# Patient Record
Sex: FEMALE | Race: WHITE | NOT HISPANIC OR LATINO | Employment: UNEMPLOYED | URBAN - METROPOLITAN AREA
[De-identification: names, ages, dates, MRNs, and addresses within clinical notes are randomized per-mention and may not be internally consistent; named-entity substitution may affect disease eponyms.]

---

## 2017-06-13 LAB
A1C: 5.3
CHOLEST SERPL-MSCNC: 219 MG/DL (ref 0–200)
HDLC SERPL-MCNC: 59 MG/DL
LDLC SERPL CALC-MCNC: 142 MG/DL
NON HDL CHOL. (LDL+VLDL): 160
TRIGL SERPL-MCNC: 89 MG/DL
TSH SERPL DL<=0.05 MIU/L-ACNC: 2.04 UIU/ML

## 2017-10-06 ENCOUNTER — OFFICE VISIT (OUTPATIENT)
Dept: INTERNAL MEDICINE | Facility: CLINIC | Age: 57
End: 2017-10-06
Payer: COMMERCIAL

## 2017-10-06 ENCOUNTER — OFFICE VISIT (OUTPATIENT)
Dept: GASTROENTEROLOGY | Facility: CLINIC | Age: 57
End: 2017-10-06
Payer: COMMERCIAL

## 2017-10-06 VITALS
OXYGEN SATURATION: 97 % | HEART RATE: 75 BPM | TEMPERATURE: 97 F | SYSTOLIC BLOOD PRESSURE: 132 MMHG | HEIGHT: 66 IN | BODY MASS INDEX: 47.09 KG/M2 | WEIGHT: 293 LBS | DIASTOLIC BLOOD PRESSURE: 80 MMHG

## 2017-10-06 VITALS
HEIGHT: 66 IN | BODY MASS INDEX: 47.09 KG/M2 | DIASTOLIC BLOOD PRESSURE: 85 MMHG | HEART RATE: 70 BPM | WEIGHT: 293 LBS | SYSTOLIC BLOOD PRESSURE: 132 MMHG

## 2017-10-06 DIAGNOSIS — R13.10 DYSPHAGIA, UNSPECIFIED TYPE: Primary | ICD-10-CM

## 2017-10-06 DIAGNOSIS — K21.9 GASTROESOPHAGEAL REFLUX DISEASE, ESOPHAGITIS PRESENCE NOT SPECIFIED: ICD-10-CM

## 2017-10-06 DIAGNOSIS — R13.19 ESOPHAGEAL DYSPHAGIA: Primary | ICD-10-CM

## 2017-10-06 DIAGNOSIS — K92.0 HEMATEMESIS WITHOUT NAUSEA: ICD-10-CM

## 2017-10-06 DIAGNOSIS — M17.4 OTHER SECONDARY OSTEOARTHRITIS OF BOTH KNEES: ICD-10-CM

## 2017-10-06 PROBLEM — M19.91 PRIMARY OSTEOARTHRITIS: Status: ACTIVE | Noted: 2017-06-28

## 2017-10-06 PROBLEM — I10 HTN (HYPERTENSION): Status: ACTIVE | Noted: 2017-10-06

## 2017-10-06 PROBLEM — F32.A DEPRESSION: Status: ACTIVE | Noted: 2017-10-06

## 2017-10-06 PROBLEM — E78.00 HIGH CHOLESTEROL: Status: ACTIVE | Noted: 2017-10-06

## 2017-10-06 PROCEDURE — 99999 PR PBB SHADOW E&M-EST. PATIENT-LVL III: CPT | Mod: PBBFAC,,, | Performed by: INTERNAL MEDICINE

## 2017-10-06 PROCEDURE — 99999 PR PBB SHADOW E&M-NEW PATIENT-LVL III: CPT | Mod: PBBFAC,,, | Performed by: FAMILY MEDICINE

## 2017-10-06 PROCEDURE — 99203 OFFICE O/P NEW LOW 30 MIN: CPT | Mod: S$GLB,,, | Performed by: INTERNAL MEDICINE

## 2017-10-06 PROCEDURE — 99203 OFFICE O/P NEW LOW 30 MIN: CPT | Mod: S$GLB,,, | Performed by: FAMILY MEDICINE

## 2017-10-06 RX ORDER — CELECOXIB 200 MG/1
200 CAPSULE ORAL DAILY
COMMUNITY
Start: 2017-09-25 | End: 2018-02-06 | Stop reason: ALTCHOICE

## 2017-10-06 RX ORDER — TRIAMCINOLONE ACETONIDE 1 MG/G
CREAM TOPICAL
COMMUNITY
Start: 2017-09-25 | End: 2020-01-23

## 2017-10-06 RX ORDER — SUCRALFATE 1 G/1
1 TABLET ORAL
Status: ON HOLD | COMMUNITY
Start: 2017-10-04 | End: 2017-10-11 | Stop reason: HOSPADM

## 2017-10-06 RX ORDER — PANTOPRAZOLE SODIUM 40 MG/1
40 TABLET, DELAYED RELEASE ORAL DAILY
Qty: 30 TABLET | Refills: 11 | Status: SHIPPED | OUTPATIENT
Start: 2017-10-06 | End: 2018-11-30 | Stop reason: SDUPTHER

## 2017-10-06 RX ORDER — LOSARTAN POTASSIUM 50 MG/1
100 TABLET ORAL
COMMUNITY
Start: 2017-08-08 | End: 2018-11-02 | Stop reason: SDUPTHER

## 2017-10-06 RX ORDER — BUPROPION HYDROCHLORIDE 150 MG/1
300 TABLET ORAL DAILY
COMMUNITY
Start: 2017-09-25 | End: 2018-04-11 | Stop reason: SDUPTHER

## 2017-10-06 RX ORDER — MELOXICAM 7.5 MG/1
7.5 TABLET ORAL DAILY
COMMUNITY
Start: 2017-09-25 | End: 2018-02-21 | Stop reason: SDUPTHER

## 2017-10-06 RX ORDER — DICLOFENAC SODIUM 10 MG/G
2 GEL TOPICAL 4 TIMES DAILY
Qty: 1 TUBE | Refills: 3 | Status: SHIPPED | OUTPATIENT
Start: 2017-10-06 | End: 2019-09-04

## 2017-10-06 RX ORDER — HYDROCHLOROTHIAZIDE 12.5 MG/1
12.5 CAPSULE ORAL DAILY
COMMUNITY
Start: 2017-09-25

## 2017-10-06 NOTE — LETTER
October 6, 2017      Henry Jaramillo MD  [address] [place] [id number]           [facility]  [address] [place] [id number]  Phone: [phone]  Fax: [phone]          Patient: Malina Herbert   MR Number: [id number]   YOB: 1960   Date of Visit: 10/6/2017       Dear Dr. Henry Jaramillo:    Thank you for referring Malina Herbert to me for evaluation. Attached you will find relevant portions of my assessment and plan of care.    If you have questions, please do not hesitate to call me. I look forward to following Malina Herbert along with you.    Sincerely,    Galen Gonzalez III, MD    Enclosure  CC:  No Recipients    If you would like to receive this communication electronically, please contact [email or web address] or [phone] to request more information on [employer] access.    For providers and/or their staff who would like to refer a patient to [facility], please contact us through our one-stop-shop provider referral line, [facility], at [phone].    If you feel you have received this communication in error or would no longer like to receive these types of communications, please e-mail [email or web address]

## 2017-10-06 NOTE — PROGRESS NOTES
Subjective:       Patient ID: Malina Herbert is a 57 y.o. female.    Chief Complaint: Dysphagia and Cough (blood)    The patient complains of dysphagia to rice and meats predominantly off and on for ~ the last year. This seems to be getting worse over the past 2 months, meaning increasing in frequency. She has a history of GERD with mild occurrence 4-5 years ago which was treated with Nexium. She has not required daily treatment since then but she still has to take PPI every now and then depending on what she eats or drinks. She has increased her weight over the past year some 20-30 pounds.     There is no aversion to the sight or smell of food. There is no BRBPR or melena. There is a degree of early satiety. There is no change in bowel habits as long as she drinks her coffee. She at times has to vomit up food lodged in her esophagus and has recently seen some blood. She had an esophagram Wednesday showing a small sliding H/H but was otherwise normal. She has been on Mobic or Celebrex for arthritis.      Review of Systems   Constitutional: Negative for activity change, appetite change, chills, diaphoresis, fatigue, fever and unexpected weight change.   HENT: Positive for sore throat. Negative for congestion, ear discharge, ear pain, hearing loss, nosebleeds, postnasal drip and tinnitus.    Eyes: Negative for photophobia and visual disturbance.   Respiratory: Negative for apnea, cough, choking, chest tightness, shortness of breath and wheezing.    Cardiovascular: Positive for chest pain. Negative for palpitations and leg swelling.   Gastrointestinal: Negative for abdominal distention, abdominal pain, anal bleeding, blood in stool, constipation, diarrhea, nausea, rectal pain and vomiting.   Genitourinary: Negative for difficulty urinating, dyspareunia, dysuria, flank pain, frequency, hematuria, menstrual problem, pelvic pain, urgency, vaginal bleeding and vaginal discharge.   Musculoskeletal: Positive for back pain and  gait problem. Negative for arthralgias, joint swelling, myalgias and neck stiffness.   Skin: Negative for pallor and rash.   Neurological: Negative for dizziness, tremors, seizures, syncope, speech difficulty, weakness, numbness and headaches.   Hematological: Negative for adenopathy.   Psychiatric/Behavioral: Negative for agitation, confusion, hallucinations, sleep disturbance and suicidal ideas.       Objective:      Physical Exam   Constitutional: She is oriented to person, place, and time. She appears well-developed and well-nourished.   Morbid Obesity   HENT:   Head: Normocephalic and atraumatic.   Bilateral turbinate congestion   Eyes: Conjunctivae and EOM are normal. Pupils are equal, round, and reactive to light. Right eye exhibits no discharge. Left eye exhibits no discharge. No scleral icterus.   Neck: Normal range of motion. Neck supple. No JVD present. No thyromegaly present.   Cardiovascular: Normal rate, regular rhythm, normal heart sounds and intact distal pulses.  Exam reveals no gallop and no friction rub.    No murmur heard.  Pulmonary/Chest: Effort normal and breath sounds normal. No respiratory distress. She has no wheezes. She has no rales. She exhibits no tenderness.   Abdominal: Soft. Bowel sounds are normal. She exhibits no distension and no mass. There is no tenderness. There is no rebound and no guarding.   Musculoskeletal: Normal range of motion. She exhibits no edema.   Lymphadenopathy:     She has no cervical adenopathy.   Neurological: She is alert and oriented to person, place, and time. She has normal reflexes. She exhibits normal muscle tone. Coordination normal.   Skin: Skin is warm and dry. No rash noted. No erythema. No pallor.   Psychiatric: She has a normal mood and affect. Her behavior is normal. Judgment and thought content normal.   Vitals reviewed.      Assessment:     Dysphagia    Hematemesis    Morbid Obesity.    DJD    Hx of GERD  No diagnosis found.    Plan:     EGD

## 2017-10-06 NOTE — PROGRESS NOTES
"Subjective:       Patient ID: Malina Herbert is a 57 y.o. female.    Chief Complaint: Hospital Follow Up (unable to swallow having nausea and vomiting, bloody emesis.)    HPI     56 yo F  PMH of Osteoarthritis - Knee - bilateral  Takes meloxicam 7.5 and Celebrex and Joint MD ( an ordered tablet - Does not appear to be NSAID on Internet search - From [facility] )    2 days ago -  ER Visit  OLOL  Diagnosis left with was " Hiatal Hernia and Hematemesis without Nausea"    Went to ER because was throwing up a lot of blood. Reports she was having chest pain.  Reports she was vomiting for past 6 months - with trouble swallowing - feels eats a few bites and feels like it get stuck. No matter how small.  Unless she throws it up she can't swallow.  Feels certain foods get stuck - rice, meat.  Liquids can go down - but states once gets hard to swallow meat it gets hard to swallow liquids.    Wednesday morning was eating - felt bad - threw up - and on 2nd throw up she felt it was  Big clump of blood. First vomit was food, then 2nd vomit felt a lot of blood come up.      Esophagram  Performed  " Esophagus shows normal distensibility, course, caliber, contour, no focal lesion, Mass, Normal transit and epmtying into stomach, Sphincters open normal, Small hiatal hernia, No reflux"    Takes Mobic daily for long time.  Takes celebrex daily for several months.      Chest X'ray  14 mm fairly dense nodular opacity seen in R middle lobe"    Review of Systems   Constitutional: Negative for chills and fever.   HENT: Positive for sore throat and trouble swallowing. Negative for congestion and voice change.    Respiratory: Negative for shortness of breath.    Cardiovascular: Negative for chest pain.   Gastrointestinal: Positive for vomiting. Negative for abdominal pain, blood in stool, constipation, diarrhea and nausea.   Endocrine: Negative for polydipsia and polyuria.        Takes a fluid pill - makes her urinate more   Genitourinary: Negative " "for difficulty urinating and dysuria.   Musculoskeletal: Positive for arthralgias, back pain and gait problem.       Objective:      Physical Exam   Constitutional: She is oriented to person, place, and time. She appears well-developed and well-nourished. She does not have a sickly appearance. No distress.   HENT:   Head: Normocephalic and atraumatic.   Right Ear: External ear normal.   Left Ear: External ear normal.   Eyes: Conjunctivae, EOM and lids are normal.   Neck: Trachea normal, normal range of motion and full passive range of motion without pain.   Cardiovascular: Normal rate, regular rhythm, normal heart sounds and intact distal pulses.    Pulmonary/Chest: Effort normal and breath sounds normal. No stridor. No respiratory distress.   Abdominal: Soft. Normal appearance and bowel sounds are normal. She exhibits no distension. There is no tenderness. There is no guarding. No hernia.   Musculoskeletal: Normal range of motion.   Lymphadenopathy:     She has no cervical adenopathy.   Neurological: She is alert and oriented to person, place, and time. She is not disoriented.   Skin: Skin is warm, dry and intact. No rash noted. She is not diaphoretic.   Psychiatric: She has a normal mood and affect. Her speech is normal and behavior is normal. Thought content normal.       Assessment:       1. Dysphagia, unspecified type    2. Hematemesis without nausea        Plan:   Dysphagia, unspecified type  GI evaluation:    Hematemesis without nausea    Patient takes 2 NSAIDS -daily  Mobic and Celebrex  Concerned about NSAID induced gastritis. Must also consider esophageal tears - she does vomit frequently due to swallow problems.  Advising patient to Stop NSAIDS.  No further blood in vomit for 2 days  Prescribing PPI  Needs GI evaluation for Dysphagia and Hematemesis.  Acetaminophen and Voltaren Gel.    Reviewed patients "Mychart" from her PCP  Renal function good.  A1c decent  High cholesterol noted.              Patient " has a PCP. She has a follow up visit next week with him.    Advising her to follow up with him about lung nodule.

## 2017-10-11 ENCOUNTER — ANESTHESIA EVENT (OUTPATIENT)
Dept: ENDOSCOPY | Facility: HOSPITAL | Age: 57
End: 2017-10-11
Payer: COMMERCIAL

## 2017-10-11 ENCOUNTER — HOSPITAL ENCOUNTER (OUTPATIENT)
Facility: HOSPITAL | Age: 57
Discharge: HOME | End: 2017-10-11
Attending: INTERNAL MEDICINE | Admitting: INTERNAL MEDICINE
Payer: COMMERCIAL

## 2017-10-11 ENCOUNTER — SURGERY (OUTPATIENT)
Age: 57
End: 2017-10-11

## 2017-10-11 ENCOUNTER — ANESTHESIA (OUTPATIENT)
Dept: ENDOSCOPY | Facility: HOSPITAL | Age: 57
End: 2017-10-11
Payer: COMMERCIAL

## 2017-10-11 VITALS
WEIGHT: 293 LBS | RESPIRATION RATE: 18 BRPM | BODY MASS INDEX: 47.09 KG/M2 | HEIGHT: 66 IN | HEART RATE: 69 BPM | SYSTOLIC BLOOD PRESSURE: 131 MMHG | DIASTOLIC BLOOD PRESSURE: 89 MMHG | TEMPERATURE: 98 F | OXYGEN SATURATION: 98 %

## 2017-10-11 DIAGNOSIS — K25.9 MULTIPLE GASTRIC ULCERS: ICD-10-CM

## 2017-10-11 DIAGNOSIS — K20.80 ESOPHAGITIS, LOS ANGELES GRADE D: Primary | ICD-10-CM

## 2017-10-11 DIAGNOSIS — R13.10 DYSPHAGIA: ICD-10-CM

## 2017-10-11 PROCEDURE — 37000009 HC ANESTHESIA EA ADD 15 MINS: Performed by: INTERNAL MEDICINE

## 2017-10-11 PROCEDURE — 88305 TISSUE EXAM BY PATHOLOGIST: CPT | Performed by: PATHOLOGY

## 2017-10-11 PROCEDURE — 27201012 HC FORCEPS, HOT/COLD, DISP: Performed by: INTERNAL MEDICINE

## 2017-10-11 PROCEDURE — 43239 EGD BIOPSY SINGLE/MULTIPLE: CPT | Performed by: INTERNAL MEDICINE

## 2017-10-11 PROCEDURE — 37000008 HC ANESTHESIA 1ST 15 MINUTES: Performed by: INTERNAL MEDICINE

## 2017-10-11 PROCEDURE — 63600175 PHARM REV CODE 636 W HCPCS: Performed by: NURSE ANESTHETIST, CERTIFIED REGISTERED

## 2017-10-11 PROCEDURE — 25000003 PHARM REV CODE 250: Performed by: NURSE ANESTHETIST, CERTIFIED REGISTERED

## 2017-10-11 PROCEDURE — 25000003 PHARM REV CODE 250: Performed by: INTERNAL MEDICINE

## 2017-10-11 PROCEDURE — 88305 TISSUE EXAM BY PATHOLOGIST: CPT | Mod: 26,,, | Performed by: PATHOLOGY

## 2017-10-11 RX ORDER — LIDOCAINE HYDROCHLORIDE 20 MG/ML
INJECTION, SOLUTION EPIDURAL; INFILTRATION; INTRACAUDAL; PERINEURAL
Status: DISCONTINUED | OUTPATIENT
Start: 2017-10-11 | End: 2017-10-11

## 2017-10-11 RX ORDER — PROPOFOL 10 MG/ML
VIAL (ML) INTRAVENOUS
Status: DISCONTINUED | OUTPATIENT
Start: 2017-10-11 | End: 2017-10-11

## 2017-10-11 RX ORDER — SODIUM CHLORIDE, SODIUM LACTATE, POTASSIUM CHLORIDE, CALCIUM CHLORIDE 600; 310; 30; 20 MG/100ML; MG/100ML; MG/100ML; MG/100ML
INJECTION, SOLUTION INTRAVENOUS CONTINUOUS PRN
Status: DISCONTINUED | OUTPATIENT
Start: 2017-10-11 | End: 2017-10-11

## 2017-10-11 RX ORDER — SUCRALFATE 1 G/10ML
1 SUSPENSION ORAL EVERY 6 HOURS
Status: DISCONTINUED | OUTPATIENT
Start: 2017-10-11 | End: 2017-10-11 | Stop reason: HOSPADM

## 2017-10-11 RX ORDER — SODIUM CHLORIDE, SODIUM LACTATE, POTASSIUM CHLORIDE, CALCIUM CHLORIDE 600; 310; 30; 20 MG/100ML; MG/100ML; MG/100ML; MG/100ML
INJECTION, SOLUTION INTRAVENOUS CONTINUOUS
Status: DISCONTINUED | OUTPATIENT
Start: 2017-10-11 | End: 2017-10-11 | Stop reason: HOSPADM

## 2017-10-11 RX ADMIN — LIDOCAINE HYDROCHLORIDE 40 MG: 20 INJECTION, SOLUTION EPIDURAL; INFILTRATION; INTRACAUDAL; PERINEURAL at 07:10

## 2017-10-11 RX ADMIN — PROPOFOL 40 MG: 10 INJECTION, EMULSION INTRAVENOUS at 07:10

## 2017-10-11 RX ADMIN — SODIUM CHLORIDE, SODIUM LACTATE, POTASSIUM CHLORIDE, AND CALCIUM CHLORIDE: 600; 310; 30; 20 INJECTION, SOLUTION INTRAVENOUS at 07:10

## 2017-10-11 RX ADMIN — PROPOFOL 60 MG: 10 INJECTION, EMULSION INTRAVENOUS at 07:10

## 2017-10-11 RX ADMIN — SODIUM CHLORIDE, SODIUM LACTATE, POTASSIUM CHLORIDE, AND CALCIUM CHLORIDE: 600; 310; 30; 20 INJECTION, SOLUTION INTRAVENOUS at 06:10

## 2017-10-11 NOTE — DISCHARGE SUMMARY
[facility] -   Brief Operative Note     SUMMARY     Surgery Date: 10/11/2017     Surgeon(s) and Role:     * Galen Gonzalez III, MD - Primary    Assisting Surgeon: None    Pre-op Diagnosis:  Esophageal dysphagia [R13.10]  Hematemesis without nausea [K92.0]    Post-op Diagnosis:  Post-Op Diagnosis Codes:     * Esophageal dysphagia [R13.10]     * Hematemesis without nausea [K92.0]      - Esophagitis      - Gastric Ulcers  Procedure(s) (LRB):  ESOPHAGOGASTRODUODENOSCOPY (EGD) (N/A)    Anesthesia: Monitor Anesthesia Care    Description of the findings of the procedure: Procedures completed. See Procedure note for full details.    Findings/Key Components: Procedures completed. See Procedure note for full details.    Prosthetics/Devices: None    Estimated Blood Loss: * No values recorded between 10/11/2017 12:00 AM and 10/11/2017  7:56 AM *         Specimens:   Specimen (12h ago through future)    Start     Ordered    10/11/17 0729  Specimen to Pathology - Surgery  Once     Comments:  1.  Antrum ulcers Biopsy ,superficial2.  Esophagus biopsy,Esophaguitis      10/11/17 0740          Discharge Note    SUMMARY     Admit Date: 10/11/2017    Discharge Date and Time: 10/11/2017    Hospital Course (synopsis of major diagnoses, care, treatment, and services provided during the course of the hospital stay):  Procedures completed. See Procedure note for full details. Discharge patient when discharge criteria met.    Final Diagnosis: Post-Op Diagnosis Codes:     * Esophageal dysphagia [R13.10]     * Hematemesis without nausea [K92.0]      - Esophagitis      - Gastric Ulcers  Disposition: Discharge patient when discharge criteria met.    Follow Up/Patient Instructions:       Medications:  Reconciled Home Medications: Current Discharge Medication List      CONTINUE these medications which have NOT CHANGED    Details   buPROPion (WELLBUTRIN XL) 150 MG TB24 tablet Take 150 mg by mouth once daily.      celecoxib (CELEBREX)  200 MG capsule Take 200 mg by mouth once daily.      diclofenac sodium 1 % Gel Apply 2 g topically 4 (four) times daily.  Qty: 1 Tube, Refills: 3      hydrochlorothiazide (MICROZIDE) 12.5 mg capsule Take 12.5 mg by mouth once daily.      losartan (COZAAR) 50 MG tablet Take 50 mg by mouth.      meloxicam (MOBIC) 7.5 MG tablet Take 7.5 mg by mouth once daily.      pantoprazole (PROTONIX) 40 MG tablet Take 1 tablet (40 mg total) by mouth once daily.  Qty: 30 tablet, Refills: 11    Associated Diagnoses: Hematemesis without nausea      sucralfate (CARAFATE) 1 gram tablet Take 1 g by mouth.      triamcinolone acetonide 0.1% (KENALOG) 0.1 % cream               Discharge Procedure Orders  Diet general     Activity as tolerated

## 2017-10-11 NOTE — ANESTHESIA POSTPROCEDURE EVALUATION
"Anesthesia Post Evaluation    Patient: Terri Herbert    Procedure(s) Performed: Procedure(s) (LRB):  ESOPHAGOGASTRODUODENOSCOPY (EGD) (N/A)    Final Anesthesia Type: MAC  Patient location during evaluation: GI PACU  Patient participation: Yes- Able to Participate  Level of consciousness: awake and alert and oriented  Post-procedure vital signs: reviewed and stable  Pain management: adequate  Airway patency: patent  PONV status at discharge: No PONV  Anesthetic complications: no      Cardiovascular status: hemodynamically stable  Respiratory status: unassisted, spontaneous ventilation and room air  Hydration status: euvolemic  Follow-up not needed.        Visit Vitals  /74 (BP Location: Left arm, Patient Position: Lying)   Pulse 75   Temp 36.6 °C (97.8 °F) (Oral)   Resp 17   Ht 5' 5.5" (1.664 m)   Wt (!) 166 kg (366 lb)   SpO2 (!) 94%   Breastfeeding? No   BMI 59.98 kg/m²       Pain/Shagufta Score: No Data Recorded      "

## 2017-10-11 NOTE — DISCHARGE INSTRUCTIONS
Esophagitis     With esophagitis, the lining of the esophagus is inflamed.   Do you often have burning pain in your chest? You may have esophagitis. This is when the lining of the esophagus becomes red and swollen (inflamed). The esophagus is the tube that connects your throat to your stomach. This sheet tells you more about esophagitis. It also explains your treatment options.  Main types of esophagitis  Reflux esophagitis. This is the more common type. It is caused by GERD (gastroesophageal reflux disease). Stomach contents with stomach acid flow back up into the esophagus. This happens over and over. It leads to inflammation. Risk factors can include:  · Being overweight  · Asthma  · Smoking  · Pregnancy  · Frequent vomiting  · Certain medicines (such as aspirin and other anti-inflammatories)  · Hiatal hernia  Infectious esophagitis. This is caused by an infection. You are more at risk for this if you have a weakened immune system and poor nutrition. Antibiotic use can also be a factor. The infection is often due to the following:  · A type of fungus (typically candida)  · A virus, such as herpes simplex virus 1 (HSV-1) or cytomegalovirus (CMV)  Eosinophilic esophagitis. Foods or other things around you can give you an allergic reaction. This triggers an immune response and leads to esophagitis.  Pill-induced esophagitis. Certain types of medicines can cause inflammation and ulcers in the esophagus. These include doxycycline, aspirin, NSAIDs, alendronate, potassium, quinidine, iron.  Symptoms of esophagitis  The following symptoms can occur with esophagitis:  · Pain when swallowing, or trouble swallowing  · Pain behind your breastbone (heartburn)  · Acid regurgitation  · Chronic sore throat  · Gum Inflammation  · Cavities  · Bad breath  · Nausea  · Pain in your upper belly (abdomen)  · Bleeding (indicated by bright red vomit or black, tarry stool)  These symptoms occur more often with reflux  esophagitis:  · Coughing, wheezing, or asthma  · Hoarseness  Diagnosis of esophagitis  Your healthcare provider will ask about your health history and symptoms. Youll also be examined. Sometimes certain tests are needed. These may include:  · Upper endoscopy. A thin, flexible tube with a tiny light and camera is used. It is inserted through the mouth down into the esophagus. This lets the provider look for damage. A small sample of tissue (biopsy) may also be removed. The sample is sent to a lab for testing.  · Upper GI X-ray with barium. An X-ray is done after you drink a substance called barium. Barium may make problems in the esophagus easier to see on an x-ray.  · Esophageal pH. A soft, thin tube is passed into the esophagus through the nose or mouth for 24 hours. It measures the acid level in the esophagus.  · Esophageal manometry. A soft, thin tube is passed into the esophagus through the nose or mouth. It measures muscle contractions in the esophagus.  Treatment of esophagitis  Medicines. Different medicines can help treat esophagitis. The medicine used will depend on the type of esophagitis you have. Talk with your healthcare provider.  Lifestyle changes. Making the following changes can help reduce irritation and ease your symptoms:  · Avoid spicy foods (pepper, chili powder, morris). Also avoid hard foods (nuts, crackers, raw vegetables) and acidic foods and drinks (tomatoes, citrus fruits and juices). Other problem foods include chocolate, peppermint, nutmeg, and foods high in fat.  · Until you can swallow without pain, follow a combined liquid and soft diet. Try foods such as cooked cereals, mashed potatoes, and soups.  · Take small bites and chew your food thoroughly.  · Avoid large meals and heavy evening meals. Don't lie down within 2 to 3 hours of eating.  · Get to or stay at a healthy weight.  · Avoid alcohol, caffeine, and smoking or tobacco products.  · Brush and floss your teeth  · Raise your  upper body by 4 to 6 inches when lying in bed. This can be done using a foam wedge. Or put blocks under the legs at the head of your bed.  Surgery. This may be needed for severe reflux esophagitis. Other noninvasive procedures to treat GERD and esophagitis are being studied. Your provider can tell you more.  Why treatment Is important  Without treatment, esophagitis can get worse. This is especially true with severe reflux esophagitis. For instance, continued symptoms can cause scarring of the esophagus. Over time, this can cause a narrowing the esophagus (stricture). This can make it hard to pass food down to the stomach. As symptoms go on they can also cause changes in the lining of the esophagus. These changes can put you at a slightly higher risk of cancer of the esophagus.   Date Last Reviewed: 7/1/2016  © 2887-5586 [employer]. [address] [place] [address], [place] [address]. All rights reserved. This information is not intended as a substitute for professional medical care. Always follow your healthcare professional's instructions.

## 2017-10-11 NOTE — ANESTHESIA RELEASE NOTE
"Anesthesia Release from PACU Note    Patient: Terri Herbert    Procedure(s) Performed: Procedure(s) (LRB):  ESOPHAGOGASTRODUODENOSCOPY (EGD) (N/A)    Anesthesia type: MAC    Post pain: Adequate analgesia    Post assessment: no apparent anesthetic complications and tolerated procedure well    Last Vitals:   Visit Vitals  /74 (BP Location: Left arm, Patient Position: Lying)   Pulse 75   Temp 36.6 °C (97.8 °F) (Oral)   Resp 17   Ht 5' 5.5" (1.664 m)   Wt (!) 166 kg (366 lb)   SpO2 (!) 94%   Breastfeeding? No   BMI 59.98 kg/m²       Post vital signs: stable    Level of consciousness: awake, alert  and oriented    Nausea/Vomiting: no nausea/no vomiting    Complications: none    Airway Patency: patent    Respiratory: unassisted, spontaneous ventilation, room air    Cardiovascular: stable and blood pressure at baseline    Hydration: euvolemic  "

## 2017-10-11 NOTE — TRANSFER OF CARE
"Anesthesia Transfer of Care Note    Patient: Terri Herbert    Procedure(s) Performed: Procedure(s) (LRB):  ESOPHAGOGASTRODUODENOSCOPY (EGD) (N/A)    Patient location: GI    Anesthesia Type: MAC    Transport from OR: Transported from OR on room air with adequate spontaneous ventilation    Post pain: adequate analgesia    Post assessment: no apparent anesthetic complications and tolerated procedure well    Post vital signs: stable    Level of consciousness: awake, alert and oriented    Nausea/Vomiting: no nausea/vomiting    Complications: none    Transfer of care protocol was followed      Last vitals:   Visit Vitals  /74 (BP Location: Left arm, Patient Position: Lying)   Pulse 75   Temp 36.6 °C (97.8 °F) (Oral)   Resp 17   Ht 5' 5.5" (1.664 m)   Wt (!) 166 kg (366 lb)   SpO2 (!) 94%   Breastfeeding? No   BMI 59.98 kg/m²     "

## 2017-10-11 NOTE — ANESTHESIA PREPROCEDURE EVALUATION
10/11/2017  Terri Herbert is a 57 y.o., female.    Anesthesia Evaluation    I have reviewed the Patient Summary Reports.    I have reviewed the Nursing Notes.   I have reviewed the Medications.     Review of Systems  Anesthesia Hx:  No problems with previous Anesthesia    Social:  Non-Smoker, No Alcohol Use    Hematology/Oncology:  Hematology Normal   Oncology Normal     EENT/Dental:EENT/Dental Normal   Cardiovascular:   Exercise tolerance: good Hypertension, well controlled    Pulmonary:  Pulmonary Normal    Renal/:  Renal/ Normal     Hepatic/GI:   GERD, well controlled    Musculoskeletal:   Arthritis     Neurological:  Neurology Normal    Endocrine:  Endocrine Normal    Dermatological:  Skin Normal    Psych:   anxiety depression          Physical Exam  General:  Well nourished, Obesity    Airway/Jaw/Neck:  Airway Findings: Mouth Opening: Normal Tongue: Normal  General Airway Assessment: Adult  Mallampati: II  TM Distance: 4 - 6 cm  Jaw/Neck Findings:  Neck ROM: Normal ROM      Dental:  Dental Findings: In tact   Chest/Lungs:  Chest/Lungs Findings: Clear to auscultation, Normal Respiratory Rate     Heart/Vascular:  Heart Findings: Rate: Normal  Rhythm: Regular Rhythm  Sounds: Normal     Abdomen:  Abdomen Findings:  Normal       Mental Status:  Mental Status Findings:  Cooperative, Alert and Oriented         Anesthesia Plan  Type of Anesthesia, risks & benefits discussed:  Anesthesia Type:  MAC  Patient's Preference:   Intra-op Monitoring Plan: standard ASA monitors  Intra-op Monitoring Plan Comments:   Post Op Pain Control Plan:   Post Op Pain Control Plan Comments:   Induction:   IV  Beta Blocker:  Patient is not currently on a Beta-Blocker (No further documentation required).       Informed Consent: Patient understands risks and agrees with Anesthesia plan.  Questions answered. Anesthesia consent  signed with patient.  ASA Score: 2     Day of Surgery Review of History & Physical: I have interviewed and examined the patient. I have reviewed the patient's H&P dated: 10/11/2017. There are no significant changes.          Ready For Surgery From Anesthesia Perspective.

## 2017-10-12 ENCOUNTER — TELEPHONE (OUTPATIENT)
Dept: GASTROENTEROLOGY | Facility: CLINIC | Age: 57
End: 2017-10-12

## 2017-10-12 DIAGNOSIS — K20.90 ESOPHAGITIS DETERMINED BY ENDOSCOPY: Primary | ICD-10-CM

## 2017-10-12 RX ORDER — SUCRALFATE 1 G/10ML
1 SUSPENSION ORAL
Qty: 414 ML | Refills: 0 | Status: SHIPPED | OUTPATIENT
Start: 2017-10-12 | End: 2017-10-23

## 2017-10-12 NOTE — PROGRESS NOTES
Spoke with Dr. Gonzalez.  Pt with esophagitis on endoscopy.  WIll send Carate to take with meals and bedtime.

## 2017-10-12 NOTE — TELEPHONE ENCOUNTER
----- Message from Ericka Lucero sent at 10/12/2017  2:50 PM CDT -----  Contact: PT   PT states she is still waiting on medicine from surgery. .[phone] (home)

## 2017-10-23 ENCOUNTER — TELEPHONE (OUTPATIENT)
Dept: GASTROENTEROLOGY | Facility: CLINIC | Age: 57
End: 2017-10-23

## 2017-10-23 NOTE — TELEPHONE ENCOUNTER
----- Message from Jessica Calhoun sent at 10/23/2017 10:11 AM CDT -----  Contact: Self  Pt is calling to speak with Staff regarding her test results.    She can be reached at [phone].    Thank you.

## 2017-10-24 ENCOUNTER — TELEPHONE (OUTPATIENT)
Dept: GASTROENTEROLOGY | Facility: CLINIC | Age: 57
End: 2017-10-24

## 2017-10-24 NOTE — TELEPHONE ENCOUNTER
----- Message from Mirlande Roman sent at 10/24/2017  8:23 AM CDT -----  Contact: pt  States she had surgery two weeks ago and she's calling regarding her test results. States she left a message yesterday and no one called her back. Please call pt at [phone]. Thank you

## 2017-10-25 ENCOUNTER — TELEPHONE (OUTPATIENT)
Dept: GASTROENTEROLOGY | Facility: CLINIC | Age: 57
End: 2017-10-25

## 2017-11-16 ENCOUNTER — OFFICE VISIT (OUTPATIENT)
Dept: GASTROENTEROLOGY | Facility: CLINIC | Age: 57
End: 2017-11-16
Payer: COMMERCIAL

## 2017-11-16 VITALS
BODY MASS INDEX: 47.09 KG/M2 | HEART RATE: 70 BPM | WEIGHT: 293 LBS | HEIGHT: 66 IN | DIASTOLIC BLOOD PRESSURE: 80 MMHG | SYSTOLIC BLOOD PRESSURE: 134 MMHG

## 2017-11-16 DIAGNOSIS — K22.2 ESOPHAGEAL STRICTURE: ICD-10-CM

## 2017-11-16 DIAGNOSIS — K25.4 CHRONIC GASTRIC ULCER WITH HEMORRHAGE: Primary | ICD-10-CM

## 2017-11-16 DIAGNOSIS — K21.00 GASTROESOPHAGEAL REFLUX DISEASE WITH ESOPHAGITIS: ICD-10-CM

## 2017-11-16 DIAGNOSIS — R13.19 ESOPHAGEAL DYSPHAGIA: ICD-10-CM

## 2017-11-16 PROCEDURE — 99213 OFFICE O/P EST LOW 20 MIN: CPT | Mod: S$GLB,,, | Performed by: INTERNAL MEDICINE

## 2017-11-16 PROCEDURE — 99999 PR PBB SHADOW E&M-EST. PATIENT-LVL III: CPT | Mod: PBBFAC,,, | Performed by: INTERNAL MEDICINE

## 2017-11-16 RX ORDER — GABAPENTIN 300 MG/1
300 CAPSULE ORAL 3 TIMES DAILY
COMMUNITY

## 2017-11-16 RX ORDER — SUCRALFATE 1 G/1
TABLET ORAL
Refills: 0 | COMMUNITY
Start: 2017-10-13 | End: 2018-02-06 | Stop reason: ALTCHOICE

## 2017-11-16 RX ORDER — METHOCARBAMOL 750 MG/1
TABLET, FILM COATED ORAL
Refills: 3 | COMMUNITY
Start: 2017-10-21 | End: 2018-05-24

## 2017-11-16 RX ORDER — LANSOPRAZOLE 30 MG/1
30 CAPSULE, DELAYED RELEASE ORAL
COMMUNITY
Start: 2017-11-07 | End: 2018-05-24

## 2017-11-16 RX ORDER — TRAMADOL HYDROCHLORIDE 50 MG/1
50 TABLET ORAL EVERY 8 HOURS PRN
Refills: 1 | COMMUNITY
Start: 2017-11-02 | End: 2018-05-10 | Stop reason: SDUPTHER

## 2017-11-16 NOTE — PROGRESS NOTES
Subjective:       Patient ID: Terri Herbert is a 57 y.o. female.    Chief Complaint: Hospital Follow Up    The patient who is known to our service from previous encounters, was initially seen by me in consultation on October 6.  At that time she was complaining of dysphagia and suggestion of hematemesis.  She subsequently underwent EGD on October 11 with findings of a medium size hiatal hernia with secondary grade D reflux esophagitis.  She was also found to have multiple nonbleeding antral gastric ulcers.  Biopsies of the ulcers were negative for dysplasia or H. pylori.  Biopsies of the esophagitis were consistent with the endoscopic evaluation.  No balloon dilation was attempted because of the presence of active inflammation.  The patient will need a repeat EGD for surveillance of the gastric ulcers and balloon dilation of the soft or stricture will be considered at that time.  We would have considered a 12 weeks for repeat, however, the patient's insurance coverage was said to be terminated at the end of the year so the repeat will be done in 8 weeks instead.    When asked about her response to treatment, she states she has only had 3 minor episodes of food hanging up since her previous evaluation.  She has had no further episodes of hematemesis.  I have voiced to her concerns regarding the use of Duran- 2 inhibitor therapy for her arthritis which appears to be severe.  She will have a discussion with the physicians taking care of her arthritis condition for consideration of an alternative treatment plan.  The patient may no longer be able to afford PPI coverage after the end of this year so we have discussed using OTC Protonix or Prevacid after her endoscopy findings have been assessed.          Review of Systems   Constitutional: Negative for activity change, appetite change, chills, diaphoresis, fatigue, fever and unexpected weight change.   HENT: Negative for congestion, ear discharge, ear pain, hearing  loss, nosebleeds, postnasal drip and tinnitus.    Eyes: Negative for photophobia and visual disturbance.   Respiratory: Negative for apnea, cough, choking, chest tightness, shortness of breath and wheezing.    Cardiovascular: Negative for chest pain, palpitations and leg swelling.   Gastrointestinal: Negative for abdominal distention, abdominal pain, anal bleeding, blood in stool, constipation, diarrhea, nausea, rectal pain and vomiting.   Genitourinary: Negative for difficulty urinating, dyspareunia, dysuria, flank pain, frequency, hematuria, menstrual problem, pelvic pain, urgency, vaginal bleeding and vaginal discharge.   Musculoskeletal: Positive for back pain, gait problem and joint swelling. Negative for arthralgias, myalgias and neck stiffness.        Joint stiffness   Skin: Positive for rash. Negative for pallor.        Discoloration of the lower legs associated with edema   Neurological: Negative for dizziness, tremors, seizures, syncope, speech difficulty, weakness, numbness and headaches.   Hematological: Negative for adenopathy.   Psychiatric/Behavioral: Negative for agitation, confusion, hallucinations, sleep disturbance and suicidal ideas.       Objective:      Physical Exam    Assessment:       1. Chronic gastric ulcer with hemorrhage    2. Gastroesophageal reflux disease with esophagitis    3. Esophageal stricture    4. Esophageal dysphagia        Plan:     Repeat EGD

## 2017-12-06 ENCOUNTER — SURGERY (OUTPATIENT)
Age: 57
End: 2017-12-06

## 2017-12-06 ENCOUNTER — ANESTHESIA (OUTPATIENT)
Dept: ENDOSCOPY | Facility: HOSPITAL | Age: 57
End: 2017-12-06
Payer: COMMERCIAL

## 2017-12-06 ENCOUNTER — ANESTHESIA EVENT (OUTPATIENT)
Dept: ENDOSCOPY | Facility: HOSPITAL | Age: 57
End: 2017-12-06
Payer: COMMERCIAL

## 2017-12-06 ENCOUNTER — HOSPITAL ENCOUNTER (OUTPATIENT)
Facility: HOSPITAL | Age: 57
Discharge: HOME | End: 2017-12-06
Attending: INTERNAL MEDICINE | Admitting: INTERNAL MEDICINE
Payer: COMMERCIAL

## 2017-12-06 VITALS — RESPIRATION RATE: 40 BRPM

## 2017-12-06 DIAGNOSIS — R13.19 ESOPHAGEAL DYSPHAGIA: ICD-10-CM

## 2017-12-06 DIAGNOSIS — K22.2 BENIGN ESOPHAGEAL STRICTURE: ICD-10-CM

## 2017-12-06 DIAGNOSIS — K25.9 MULTIPLE GASTRIC ULCERS: ICD-10-CM

## 2017-12-06 DIAGNOSIS — Z80.0 FH: COLON CANCER: Primary | ICD-10-CM

## 2017-12-06 DIAGNOSIS — K21.00 GASTROESOPHAGEAL REFLUX DISEASE WITH ESOPHAGITIS: Primary | ICD-10-CM

## 2017-12-06 PROCEDURE — 88305 TISSUE EXAM BY PATHOLOGIST: CPT | Mod: 26,,, | Performed by: PATHOLOGY

## 2017-12-06 PROCEDURE — 43239 EGD BIOPSY SINGLE/MULTIPLE: CPT | Performed by: INTERNAL MEDICINE

## 2017-12-06 PROCEDURE — 25000003 PHARM REV CODE 250: Performed by: NURSE ANESTHETIST, CERTIFIED REGISTERED

## 2017-12-06 PROCEDURE — 88305 TISSUE EXAM BY PATHOLOGIST: CPT | Performed by: PATHOLOGY

## 2017-12-06 PROCEDURE — 37000008 HC ANESTHESIA 1ST 15 MINUTES: Performed by: INTERNAL MEDICINE

## 2017-12-06 PROCEDURE — 63600175 PHARM REV CODE 636 W HCPCS: Performed by: NURSE ANESTHETIST, CERTIFIED REGISTERED

## 2017-12-06 PROCEDURE — 43239 EGD BIOPSY SINGLE/MULTIPLE: CPT | Mod: 51,,, | Performed by: INTERNAL MEDICINE

## 2017-12-06 PROCEDURE — 43249 ESOPH EGD DILATION <30 MM: CPT | Performed by: INTERNAL MEDICINE

## 2017-12-06 PROCEDURE — 37000009 HC ANESTHESIA EA ADD 15 MINS: Performed by: INTERNAL MEDICINE

## 2017-12-06 PROCEDURE — 25000003 PHARM REV CODE 250: Performed by: INTERNAL MEDICINE

## 2017-12-06 PROCEDURE — 43249 ESOPH EGD DILATION <30 MM: CPT | Mod: ,,, | Performed by: INTERNAL MEDICINE

## 2017-12-06 PROCEDURE — C1726 CATH, BAL DIL, NON-VASCULAR: HCPCS | Performed by: INTERNAL MEDICINE

## 2017-12-06 PROCEDURE — 27201012 HC FORCEPS, HOT/COLD, DISP: Performed by: INTERNAL MEDICINE

## 2017-12-06 RX ORDER — PROPOFOL 10 MG/ML
VIAL (ML) INTRAVENOUS
Status: DISCONTINUED | OUTPATIENT
Start: 2017-12-06 | End: 2017-12-06

## 2017-12-06 RX ORDER — SODIUM CHLORIDE, SODIUM LACTATE, POTASSIUM CHLORIDE, CALCIUM CHLORIDE 600; 310; 30; 20 MG/100ML; MG/100ML; MG/100ML; MG/100ML
INJECTION, SOLUTION INTRAVENOUS CONTINUOUS
Status: DISCONTINUED | OUTPATIENT
Start: 2017-12-06 | End: 2017-12-06 | Stop reason: HOSPADM

## 2017-12-06 RX ORDER — LIDOCAINE HYDROCHLORIDE 20 MG/ML
INJECTION, SOLUTION EPIDURAL; INFILTRATION; INTRACAUDAL; PERINEURAL
Status: DISCONTINUED | OUTPATIENT
Start: 2017-12-06 | End: 2017-12-06

## 2017-12-06 RX ORDER — SODIUM, POTASSIUM,MAG SULFATES 17.5-3.13G
SOLUTION, RECONSTITUTED, ORAL ORAL
Qty: 254 ML | Refills: 0 | Status: ON HOLD | OUTPATIENT
Start: 2017-12-06 | End: 2017-12-28 | Stop reason: HOSPADM

## 2017-12-06 RX ADMIN — PROPOFOL 50 MG: 10 INJECTION, EMULSION INTRAVENOUS at 09:12

## 2017-12-06 RX ADMIN — SODIUM CHLORIDE, SODIUM LACTATE, POTASSIUM CHLORIDE, AND CALCIUM CHLORIDE: 600; 310; 30; 20 INJECTION, SOLUTION INTRAVENOUS at 09:12

## 2017-12-06 RX ADMIN — SODIUM CHLORIDE, SODIUM LACTATE, POTASSIUM CHLORIDE, AND CALCIUM CHLORIDE: 600; 310; 30; 20 INJECTION, SOLUTION INTRAVENOUS at 08:12

## 2017-12-06 RX ADMIN — PROPOFOL 150 MG: 10 INJECTION, EMULSION INTRAVENOUS at 09:12

## 2017-12-06 RX ADMIN — LIDOCAINE HYDROCHLORIDE 100 MG: 20 INJECTION, SOLUTION EPIDURAL; INFILTRATION; INTRACAUDAL; PERINEURAL at 09:12

## 2017-12-06 NOTE — INTERVAL H&P NOTE
The patient has been examined and the H&P has been reviewed:I have reviewed this note and I agree with this assessment. The patient was seen in the GI office and remains stable for endoscopy at the time of this present evaluation.       Anesthesia/Surgery risks, benefits and alternative options discussed and understood by patient/family.          Active Hospital Problems    Diagnosis  POA    Multiple gastric ulcers [K25.9]  Yes      Resolved Hospital Problems    Diagnosis Date Resolved POA   No resolved problems to display.

## 2017-12-06 NOTE — DISCHARGE SUMMARY
[facility]  Brief Operative Note     SUMMARY     Surgery Date: 12/6/2017     Surgeon(s) and Role:     * Galen Gonzalez III, MD - Primary    Assisting Surgeon: None    Pre-op Diagnosis:  Esophageal dysphagia [R13.10]  Chronic gastric ulcer with hemorrhage [K25.4]  Esophageal stricture [K22.2]  Gastroesophageal reflux disease with esophagitis [K21.0]    Post-op Diagnosis:  Post-Op Diagnosis Codes:     * Esophageal dysphagia [R13.10]     * Chronic gastric ulcer with hemorrhage [K25.4]     * Esophageal stricture [K22.2]     * Gastroesophageal reflux disease with esophagitis [K21.0]     - Gastritis  Procedure(s) (LRB):  ESOPHAGOGASTRODUODENOSCOPY (EGD) (N/A)    Anesthesia: Monitor Anesthesia Care    Description of the findings of the procedure: Procedures completed. See Procedure note for full details.    Findings/Key Components: Procedures completed. See Procedure note for full details.    Prosthetics/Devices: None    Estimated Blood Loss: * No values recorded between 12/6/2017 12:00 AM and 12/6/2017  9:18 AM *         Specimens:   Specimen (12h ago through future)    Start     Ordered    12/06/17 0912  Specimen to Pathology - Surgery  Once     Comments:  #1 antral bx, r/o h.pylori      12/06/17 0919          Discharge Note    SUMMARY     Admit Date: 12/6/2017    Discharge Date and Time: 12/6/2017    Hospital Course (synopsis of major diagnoses, care, treatment, and services provided during the course of the hospital stay):  Procedures completed. See Procedure note for full details. Discharge patient when discharge criteria met.    Final Diagnosis: Post-Op Diagnosis Codes:     * Esophageal dysphagia [R13.10]     * Chronic gastric ulcer with hemorrhage [K25.4]     * Esophageal stricture [K22.2]     * Gastroesophageal reflux disease with esophagitis [K21.0]      - Gastritis  Disposition: Discharge patient when discharge criteria met.    Follow Up/Patient Instructions:       Medications:  Reconciled Home  Medications: Current Discharge Medication List      CONTINUE these medications which have NOT CHANGED    Details   buPROPion (WELLBUTRIN XL) 150 MG TB24 tablet Take 150 mg by mouth once daily.      celecoxib (CELEBREX) 200 MG capsule Take 200 mg by mouth once daily.      diclofenac sodium 1 % Gel Apply 2 g topically 4 (four) times daily.  Qty: 1 Tube, Refills: 3      gabapentin (NEURONTIN) 300 MG capsule Take 300 mg by mouth 3 (three) times daily.      hydrochlorothiazide (MICROZIDE) 12.5 mg capsule Take 12.5 mg by mouth once daily.      lansoprazole (PREVACID) 30 MG capsule Take 30 mg by mouth.      losartan (COZAAR) 50 MG tablet Take 50 mg by mouth.      meloxicam (MOBIC) 7.5 MG tablet Take 7.5 mg by mouth once daily.      methocarbamol (ROBAXIN) 750 MG Tab TAKE 1 TABLET AT BEDTIME AS NEEDED FOR ANXIETY  Refills: 3      pantoprazole (PROTONIX) 40 MG tablet Take 1 tablet (40 mg total) by mouth once daily.  Qty: 30 tablet, Refills: 11    Associated Diagnoses: Hematemesis without nausea      traMADol (ULTRAM) 50 mg tablet Take 50 mg by mouth every 8 (eight) hours as needed.  Refills: 1      triamcinolone acetonide 0.1% (KENALOG) 0.1 % cream       sucralfate (CARAFATE) 1 gram tablet TAKE 1 TABLET BY MOUTH 4 TIMES A DAY FOR 7 DAYS  Refills: 0              Discharge Procedure Orders  Diet general     Activity as tolerated

## 2017-12-06 NOTE — PLAN OF CARE
FRIEND AT BEDSIDE. DR FUCHS SPOKE TO PT AND FRIEND. DISCHARGE INSTRUCTIONS GIVEN TO PT AND FAMILY. VERBALIZE UNDERSTANDING

## 2017-12-06 NOTE — TRANSFER OF CARE
Anesthesia Transfer of Care Note    Patient: Terri Herbert    Procedure(s) Performed: Procedure(s) (LRB):  ESOPHAGOGASTRODUODENOSCOPY (EGD) (N/A)    Patient location: PACU    Anesthesia Type: MAC    Transport from OR: Transported from OR on room air with adequate spontaneous ventilation    Post pain: adequate analgesia    Post assessment: no apparent anesthetic complications and tolerated procedure well    Post vital signs: stable    Level of consciousness: awake and responds to stimulation    Nausea/Vomiting: no nausea/vomiting    Complications: none    Transfer of care protocol was followed      Last vitals:   Visit Vitals  BP (!) 115/54 (BP Location: Right arm)   Pulse 79   Temp 36.4 °C (97.5 °F) (Oral)   Resp 18   Wt (!) 169.2 kg (373 lb)   SpO2 (!) 92%   Breastfeeding? No   BMI 61.13 kg/m²

## 2017-12-06 NOTE — ANESTHESIA PREPROCEDURE EVALUATION
12/06/2017  Terri Herbert is a 57 y.o., female.    Pre-op Assessment    I have reviewed the Patient Summary Reports.     I have reviewed the Nursing Notes.   I have reviewed the Medications.     Review of Systems  Anesthesia Hx:  No problems with previous Anesthesia  Denies Family Hx of Anesthesia complications.   Denies Personal Hx of Anesthesia complications.   Social:  Non-Smoker, No Alcohol Use    Hematology/Oncology:  Hematology Normal   Oncology Normal     EENT/Dental:EENT/Dental Normal   Cardiovascular:   Exercise tolerance: good Hypertension, well controlled    Pulmonary:  Pulmonary Normal    Renal/:  Renal/ Normal     Hepatic/GI:   PUD, GERD, well controlled    Musculoskeletal:   Arthritis   Spine Disorders:    Neurological:  Neurology Normal    Endocrine:  Endocrine Normal    Dermatological:  Skin Normal    Psych:   anxiety depression          Physical Exam  General:  Well nourished, Morbid Obesity    Airway/Jaw/Neck:  Airway Findings: Mouth Opening: Normal Tongue: Normal  General Airway Assessment: Adult  Mallampati: II  TM Distance: 4 - 6 cm  Jaw/Neck Findings:  Neck ROM: Normal ROM      Dental:  Dental Findings: In tact   Chest/Lungs:  Chest/Lungs Findings: Clear to auscultation, Normal Respiratory Rate     Heart/Vascular:  Heart Findings: Rate: Normal  Rhythm: Regular Rhythm  Sounds: Normal     Abdomen:  Abdomen Findings:  Normal       Mental Status:  Mental Status Findings:  Cooperative, Alert and Oriented         Anesthesia Plan  Type of Anesthesia, risks & benefits discussed:  Anesthesia Type:  MAC  Patient's Preference:   Intra-op Monitoring Plan: standard ASA monitors  Intra-op Monitoring Plan Comments:   Post Op Pain Control Plan:   Post Op Pain Control Plan Comments:   Induction:   IV  Beta Blocker:  Patient is not currently on a Beta-Blocker (No further documentation required).        Informed Consent: Patient understands risks and agrees with Anesthesia plan.  Questions answered. Anesthesia consent signed with patient.  ASA Score: 3     Day of Surgery Review of History & Physical: I have interviewed and examined the patient. I have reviewed the patient's H&P dated: 10/11/2017. There are no significant changes.          Ready For Surgery From Anesthesia Perspective.

## 2017-12-06 NOTE — ANESTHESIA POSTPROCEDURE EVALUATION
Anesthesia Post Evaluation    Patient: Terri Herbert    Procedure(s) Performed: Procedure(s) (LRB):  ESOPHAGOGASTRODUODENOSCOPY (EGD) (N/A)    Final Anesthesia Type: MAC  Patient location during evaluation: PACU  Patient participation: Yes- Able to Participate  Level of consciousness: awake and alert and oriented  Post-procedure vital signs: reviewed and stable  Pain management: adequate  Airway patency: patent    Anesthetic complications: no      Cardiovascular status: blood pressure returned to baseline, hemodynamically stable and stable  Respiratory status: unassisted, spontaneous ventilation and room air  Hydration status: euvolemic  Follow-up not needed.        Visit Vitals  BP (!) 115/54 (BP Location: Right arm)   Pulse 79   Temp 36.4 °C (97.5 °F) (Oral)   Resp 18   Wt (!) 169.2 kg (373 lb)   SpO2 (!) 92%   Breastfeeding? No   BMI 61.13 kg/m²       Pain/Shagufta Score: Pain Assessment Performed: Yes (12/6/2017  8:15 AM)  Presence of Pain: denies (12/6/2017  8:15 AM)  Shagufta Score: 9 (12/6/2017  9:25 AM)

## 2017-12-06 NOTE — DISCHARGE INSTRUCTIONS
Gastritis (Adult)    Gastritis is inflammation and irritation of the stomach lining. It can be present for a short time (acute) or be long lasting (chronic). Gastritis is often caused by infection with bacteria called H pylori. More than a third of people in the [place] have this bacteria in their bodies. In many cases, H pylori causes no problems or symptoms. In some people, though, the infection irritates the stomach lining and causes gastritis. Other causes of stomach irritation include drinking alcohol or taking pain-relieving medicines called NSAIDs (such as aspirin or ibuprofen).   Symptoms of gastritis can include:  · Abdominal pain or bloating  · Loss of appetite  · Nausea or vomiting  · Vomiting blood or having black stools  · Feeling more tired than usual  An inflamed and irritated stomach lining is more likely to develop a sore called an ulcer. To help prevent this, gastritis should be treated.  Home care  If needed, medicines may be prescribed. If you have H pylori infection, treating it will likely relieve your symptoms. Other changes can help reduce stomach irritation and help it heal.  · If you have been prescribed medicines for H pylori infection, take them as directed. Take all of the medicine until it is finished or your healthcare provider tells you to stop, even if you feel better.  · Your healthcare provider may recommend avoiding NSAIDs. If you take daily aspirin for your heart or other medical reasons, do not stop without talking to your healthcare provider first.  · Avoid drinking alcohol.  · Stop smoking. Smoking can irritate the stomach and delay healing. As much as possible, stay away from second hand smoke.  Follow-up care  Follow up with your healthcare provider, or as advised by our staff. Testing may be needed to check for inflammation or an ulcer.  When to seek medical advice  Call your healthcare provider for any of the following:  · Stomach pain that gets worse or moves to the lower  right abdomen (appendix area)  · Chest pain that appears or gets worse, or spreads to the back, neck, shoulder, or arm  · Frequent vomiting (cant keep down liquids)  · Blood in the stool or vomit (red or black in color)  · Feeling weak or dizzy  · Fever of 100.4ºF (38ºC) or higher, or as directed by your healthcare provider  Date Last Reviewed: 6/22/2015  © 2435-9405 [employer]. [address] [place] [address]. All rights reserved. This information is not intended as a substitute for professional medical care. Always follow your healthcare professional's instructions.

## 2017-12-06 NOTE — INTERVAL H&P NOTE
The patient has been examined and the H&P has been reviewed:   Family History   Problem Relation Age of Onset    Cancer Mother      colon cancer    Diabetes Mother     COPD Father     Cirrhosis Father     Alcohol abuse Father     Diabetes Sister     Heart disease Sister     Hiatal hernia Brother      Past Medical History:   Diagnosis Date    Glaucoma     rod 2017    H/O tubal ligation 1981    History of arthroscopy of both knees 2001,1996    History of tonsillectomy 1978    Hypertension      Past Surgical History:   Procedure Laterality Date    EYE SURGERY Bilateral 01/2017    Lens replacement    knees Bilateral 1996 & 2004    TONSILLECTOMY      TUBAL LIGATION  1981     Social History     Social History    Marital status:      Spouse name: N/A    Number of children: N/A    Years of education: N/A     Occupational History    Not on file.     Social History Main Topics    Smoking status: Never Smoker    Smokeless tobacco: Never Used    Alcohol use 0.6 oz/week     1 Glasses of wine per week      Comment: ocassion    Drug use: No    Sexual activity: No      Comment:  passed in 2014     Other Topics Concern    Not on file     Social History Narrative    No narrative on file     Review of patient's allergies indicates:  No Known Allergies  No current facility-administered medications on file prior to encounter.      Current Outpatient Prescriptions on File Prior to Encounter   Medication Sig Dispense Refill    buPROPion (WELLBUTRIN XL) 150 MG TB24 tablet Take 150 mg by mouth once daily.      celecoxib (CELEBREX) 200 MG capsule Take 200 mg by mouth once daily.      diclofenac sodium 1 % Gel Apply 2 g topically 4 (four) times daily. 1 Tube 3    gabapentin (NEURONTIN) 300 MG capsule Take 300 mg by mouth 3 (three) times daily.      hydrochlorothiazide (MICROZIDE) 12.5 mg capsule Take 12.5 mg by mouth once daily.      lansoprazole (PREVACID) 30 MG capsule Take 30 mg by mouth.       losartan (COZAAR) 50 MG tablet Take 50 mg by mouth.      meloxicam (MOBIC) 7.5 MG tablet Take 7.5 mg by mouth once daily.      methocarbamol (ROBAXIN) 750 MG Tab TAKE 1 TABLET AT BEDTIME AS NEEDED FOR ANXIETY  3    pantoprazole (PROTONIX) 40 MG tablet Take 1 tablet (40 mg total) by mouth once daily. 30 tablet 11    traMADol (ULTRAM) 50 mg tablet Take 50 mg by mouth every 8 (eight) hours as needed.  1    triamcinolone acetonide 0.1% (KENALOG) 0.1 % cream       sucralfate (CARAFATE) 1 gram tablet TAKE 1 TABLET BY MOUTH 4 TIMES A DAY FOR 7 DAYS  0           Anesthesia/Surgery risks, benefits and alternative options discussed and understood by patient/family.          Active Hospital Problems    Diagnosis  POA    Multiple gastric ulcers [K25.9]  Yes      Resolved Hospital Problems    Diagnosis Date Resolved POA   No resolved problems to display.

## 2017-12-06 NOTE — ANESTHESIA RELEASE NOTE
Anesthesia Release from PACU Note    Patient: Terri Herbert    Procedure(s) Performed: Procedure(s) (LRB):  ESOPHAGOGASTRODUODENOSCOPY (EGD) (N/A)    Anesthesia type: MAC    Post pain: Adequate analgesia    Post assessment: no apparent anesthetic complications, tolerated procedure well and no evidence of recall    Last Vitals:   Visit Vitals  BP (!) 115/54 (BP Location: Right arm)   Pulse 79   Temp 36.4 °C (97.5 °F) (Oral)   Resp 18   Wt (!) 169.2 kg (373 lb)   SpO2 (!) 92%   Breastfeeding? No   BMI 61.13 kg/m²       Post vital signs: stable    Level of consciousness: awake, alert  and oriented    Nausea/Vomiting: no nausea/no vomiting    Complications: none    Airway Patency: patent    Respiratory: unassisted, spontaneous ventilation, room air    Cardiovascular: stable and blood pressure at baseline    Hydration: euvolemic

## 2017-12-07 VITALS
RESPIRATION RATE: 64 BRPM | BODY MASS INDEX: 61.13 KG/M2 | OXYGEN SATURATION: 96 % | WEIGHT: 293 LBS | HEART RATE: 69 BPM | TEMPERATURE: 98 F | SYSTOLIC BLOOD PRESSURE: 119 MMHG | DIASTOLIC BLOOD PRESSURE: 86 MMHG

## 2017-12-28 ENCOUNTER — HOSPITAL ENCOUNTER (OUTPATIENT)
Facility: HOSPITAL | Age: 57
Discharge: HOME | End: 2017-12-28
Attending: INTERNAL MEDICINE | Admitting: INTERNAL MEDICINE
Payer: COMMERCIAL

## 2017-12-28 ENCOUNTER — ANESTHESIA (OUTPATIENT)
Dept: ENDOSCOPY | Facility: HOSPITAL | Age: 57
End: 2017-12-28
Payer: COMMERCIAL

## 2017-12-28 ENCOUNTER — ANESTHESIA EVENT (OUTPATIENT)
Dept: ENDOSCOPY | Facility: HOSPITAL | Age: 57
End: 2017-12-28
Payer: COMMERCIAL

## 2017-12-28 ENCOUNTER — SURGERY (OUTPATIENT)
Age: 57
End: 2017-12-28

## 2017-12-28 DIAGNOSIS — K63.5 POLYP OF COLON, UNSPECIFIED PART OF COLON, UNSPECIFIED TYPE: Primary | ICD-10-CM

## 2017-12-28 DIAGNOSIS — K25.9 MULTIPLE GASTRIC ULCERS: ICD-10-CM

## 2017-12-28 PROCEDURE — 45380 COLONOSCOPY AND BIOPSY: CPT | Mod: 33,,, | Performed by: INTERNAL MEDICINE

## 2017-12-28 PROCEDURE — 37000009 HC ANESTHESIA EA ADD 15 MINS: Performed by: INTERNAL MEDICINE

## 2017-12-28 PROCEDURE — 63600175 PHARM REV CODE 636 W HCPCS: Performed by: NURSE ANESTHETIST, CERTIFIED REGISTERED

## 2017-12-28 PROCEDURE — 88305 TISSUE EXAM BY PATHOLOGIST: CPT | Mod: 26,,, | Performed by: PATHOLOGY

## 2017-12-28 PROCEDURE — 88305 TISSUE EXAM BY PATHOLOGIST: CPT | Performed by: PATHOLOGY

## 2017-12-28 PROCEDURE — 25000003 PHARM REV CODE 250: Performed by: NURSE ANESTHETIST, CERTIFIED REGISTERED

## 2017-12-28 PROCEDURE — 25000003 PHARM REV CODE 250: Performed by: INTERNAL MEDICINE

## 2017-12-28 PROCEDURE — 27201012 HC FORCEPS, HOT/COLD, DISP: Performed by: INTERNAL MEDICINE

## 2017-12-28 PROCEDURE — 37000008 HC ANESTHESIA 1ST 15 MINUTES: Performed by: INTERNAL MEDICINE

## 2017-12-28 PROCEDURE — 45380 COLONOSCOPY AND BIOPSY: CPT | Performed by: INTERNAL MEDICINE

## 2017-12-28 RX ORDER — LIDOCAINE HYDROCHLORIDE 10 MG/ML
INJECTION INFILTRATION; PERINEURAL
Status: DISCONTINUED | OUTPATIENT
Start: 2017-12-28 | End: 2017-12-28

## 2017-12-28 RX ORDER — PROPOFOL 10 MG/ML
VIAL (ML) INTRAVENOUS
Status: DISCONTINUED | OUTPATIENT
Start: 2017-12-28 | End: 2017-12-28

## 2017-12-28 RX ORDER — SODIUM CHLORIDE, SODIUM LACTATE, POTASSIUM CHLORIDE, CALCIUM CHLORIDE 600; 310; 30; 20 MG/100ML; MG/100ML; MG/100ML; MG/100ML
INJECTION, SOLUTION INTRAVENOUS CONTINUOUS
Status: DISCONTINUED | OUTPATIENT
Start: 2017-12-28 | End: 2017-12-28 | Stop reason: HOSPADM

## 2017-12-28 RX ADMIN — PROPOFOL 50 MG: 10 INJECTION, EMULSION INTRAVENOUS at 12:12

## 2017-12-28 RX ADMIN — PROPOFOL 100 MG: 10 INJECTION, EMULSION INTRAVENOUS at 12:12

## 2017-12-28 RX ADMIN — LIDOCAINE HYDROCHLORIDE 30 MG: 10 INJECTION, SOLUTION INFILTRATION; PERINEURAL at 12:12

## 2017-12-28 RX ADMIN — PROPOFOL 40 MG: 10 INJECTION, EMULSION INTRAVENOUS at 12:12

## 2017-12-28 RX ADMIN — SODIUM CHLORIDE, SODIUM LACTATE, POTASSIUM CHLORIDE, AND CALCIUM CHLORIDE: 600; 310; 30; 20 INJECTION, SOLUTION INTRAVENOUS at 12:12

## 2017-12-28 RX ADMIN — SODIUM CHLORIDE, SODIUM LACTATE, POTASSIUM CHLORIDE, AND CALCIUM CHLORIDE: 600; 310; 30; 20 INJECTION, SOLUTION INTRAVENOUS at 10:12

## 2017-12-28 NOTE — ANESTHESIA POSTPROCEDURE EVALUATION
"Anesthesia Post Evaluation    Patient: Terri Herbert    Procedure(s) Performed: Procedure(s) (LRB):  COLONOSCOPY (N/A)    Final Anesthesia Type: MAC  Patient location during evaluation: PACU  Patient participation: Yes- Able to Participate  Level of consciousness: awake and alert  Post-procedure vital signs: reviewed and stable  Airway patency: patent  PONV status at discharge: No PONV  Anesthetic complications: no      Cardiovascular status: blood pressure returned to baseline  Respiratory status: unassisted and spontaneous ventilation  Hydration status: euvolemic  Follow-up needed         Visit Vitals  BP (!) 140/97 (BP Location: Left arm, Patient Position: Lying)   Pulse 84   Temp 36.7 °C (98.1 °F) (Oral)   Resp 18   Ht 5' 5" (1.651 m)   Wt (!) 167.4 kg (369 lb)   SpO2 98%   Breastfeeding? No   BMI 61.40 kg/m²       Pain/Shagufta Score: Pain Assessment Performed: Yes (12/28/2017 10:55 AM)  Presence of Pain: denies (12/28/2017 10:55 AM)      "

## 2017-12-28 NOTE — DISCHARGE SUMMARY
[facility]   Brief Operative Note     SUMMARY     Surgery Date: 12/28/2017     Surgeon(s) and Role:     * Galen Gonzalez III, MD - Primary    Assisting Surgeon: None    Pre-op Diagnosis:  FH: colon cancer [Z80.0]    Post-op Diagnosis:  Post-Op Diagnosis Codes:     * FH: colon cancer [Z80.0]     - Colon Poylps  Procedure(s) (LRB):  COLONOSCOPY (N/A)    Anesthesia: Monitor Anesthesia Care    Description of the findings of the procedure: Procedures completed. See Procedure note for full details.    Findings/Key Components: Procedures completed. See Procedure note for full details.    Prosthetics/Devices: None    Estimated Blood Loss: * No values recorded between 12/28/2017 12:00 AM and 12/28/2017 12:30 PM *         Specimens:   Specimen (12h ago through future)    Start     Ordered    12/28/17 1218  Specimen to Pathology - Surgery  Once     Comments:  #1 rectal polyp x1, transverse polyp x1, splenic polyp x1      12/28/17 1232          Discharge Note    SUMMARY     Admit Date: 12/28/2017    Discharge Date and Time: 12/28/2017    Hospital Course (synopsis of major diagnoses, care, treatment, and services provided during the course of the hospital stay):  Procedures completed. See Procedure note for full details. Discharge patient when discharge criteria met.    Final Diagnosis: Post-Op Diagnosis Codes:     * FH: colon cancer [Z80.0]      - Colon Polyps  Disposition: Discharge patient when discharge criteria met.    Follow Up/Patient Instructions:       Medications:  Reconciled Home Medications: Current Discharge Medication List      CONTINUE these medications which have NOT CHANGED    Details   buPROPion (WELLBUTRIN XL) 150 MG TB24 tablet Take 150 mg by mouth once daily.      diclofenac sodium 1 % Gel Apply 2 g topically 4 (four) times daily.  Qty: 1 Tube, Refills: 3      gabapentin (NEURONTIN) 300 MG capsule Take 300 mg by mouth 3 (three) times daily.      hydrochlorothiazide (MICROZIDE) 12.5 mg  capsule Take 12.5 mg by mouth once daily.      lansoprazole (PREVACID) 30 MG capsule Take 30 mg by mouth.      losartan (COZAAR) 50 MG tablet Take 50 mg by mouth.      meloxicam (MOBIC) 7.5 MG tablet Take 7.5 mg by mouth once daily.      pantoprazole (PROTONIX) 40 MG tablet Take 1 tablet (40 mg total) by mouth once daily.  Qty: 30 tablet, Refills: 11    Associated Diagnoses: Hematemesis without nausea      sucralfate (CARAFATE) 1 gram tablet TAKE 1 TABLET BY MOUTH 4 TIMES A DAY FOR 7 DAYS  Refills: 0      celecoxib (CELEBREX) 200 MG capsule Take 200 mg by mouth once daily.      methocarbamol (ROBAXIN) 750 MG Tab TAKE 1 TABLET AT BEDTIME AS NEEDED FOR ANXIETY  Refills: 3      traMADol (ULTRAM) 50 mg tablet Take 50 mg by mouth every 8 (eight) hours as needed.  Refills: 1      triamcinolone acetonide 0.1% (KENALOG) 0.1 % cream          STOP taking these medications       sodium,potassium,mag sulfates (SUPREP BOWEL PREP KIT) 17.5-3.13-1.6 gram SolR Comments:   Reason for Stopping:                Discharge Procedure Orders  Diet general     Activity as tolerated

## 2017-12-28 NOTE — H&P
Short Stay Endoscopy History and Physical    PCP - Elida Long MD    Procedure - Colonoscopy  ASA - 2  Mallampati - per anesthesia  History of Anesthesia problems - no  Family history Anesthesia problems -  no     HPI:  This is a 57 y.o.female here for evaluation of :  FH of Colon Cancer       Reflux - no  Dysphagia - yes  Abdominal pain - no  Diarrhea - no  Anemia - no  GI bleeding - no  Nausea and vomiting-no  Early satiety-no  aversion to sight or smell of food-no    ROS:  Constitutional: No fevers, chills, No weight loss  ENT: No allergies  CV: No chest pain  Pulm: No cough, No shortness of breath  Ophtho: No vision changes  GI: see HPI  Derm: No rash  Heme: No lymphadenopathy, No bruising  MSK: No arthritis  : No dysuria, No hematuria  Endo: No hot or cold intolerance  Neuro: No syncope, No seizure  Psych: No anxiety, No depression    Medical History:  Past Medical History:   Diagnosis Date    Glaucoma     rod 2017    H/O tubal ligation 1981    History of arthroscopy of both knees 2001,1996    History of tonsillectomy 1978    Hypertension        Surgical History:  Past Surgical History:   Procedure Laterality Date    EYE SURGERY Bilateral 01/2017    Lens replacement    knees Bilateral 1996 & 2004    TONSILLECTOMY      TUBAL LIGATION  1981       Family History:  Family History   Problem Relation Age of Onset    Cancer Mother      colon cancer    Diabetes Mother     COPD Father     Cirrhosis Father     Alcohol abuse Father     Diabetes Sister     Heart disease Sister     Hiatal hernia Brother        Social History:  Social History     Social History    Marital status:      Spouse name: N/A    Number of children: N/A    Years of education: N/A     Occupational History    Not on file.     Social History Main Topics    Smoking status: Never Smoker    Smokeless tobacco: Never Used    Alcohol use 0.6 oz/week     1 Glasses of wine per week      Comment: ocassion    Drug use: No     Sexual activity: No      Comment:  passed in 2014     Other Topics Concern    Not on file     Social History Narrative    No narrative on file       Allergies: Review of patient's allergies indicates:  No Known Allergies    Medications:   No current facility-administered medications on file prior to encounter.      Current Outpatient Prescriptions on File Prior to Encounter   Medication Sig Dispense Refill    buPROPion (WELLBUTRIN XL) 150 MG TB24 tablet Take 150 mg by mouth once daily.      celecoxib (CELEBREX) 200 MG capsule Take 200 mg by mouth once daily.      diclofenac sodium 1 % Gel Apply 2 g topically 4 (four) times daily. 1 Tube 3    gabapentin (NEURONTIN) 300 MG capsule Take 300 mg by mouth 3 (three) times daily.      hydrochlorothiazide (MICROZIDE) 12.5 mg capsule Take 12.5 mg by mouth once daily.      lansoprazole (PREVACID) 30 MG capsule Take 30 mg by mouth.      losartan (COZAAR) 50 MG tablet Take 50 mg by mouth.      meloxicam (MOBIC) 7.5 MG tablet Take 7.5 mg by mouth once daily.      methocarbamol (ROBAXIN) 750 MG Tab TAKE 1 TABLET AT BEDTIME AS NEEDED FOR ANXIETY  3    pantoprazole (PROTONIX) 40 MG tablet Take 1 tablet (40 mg total) by mouth once daily. 30 tablet 11    sodium,potassium,mag sulfates (SUPREP BOWEL PREP KIT) 17.5-3.13-1.6 gram SolR As directed for colonoscopy 254 mL 0    sucralfate (CARAFATE) 1 gram tablet TAKE 1 TABLET BY MOUTH 4 TIMES A DAY FOR 7 DAYS  0    traMADol (ULTRAM) 50 mg tablet Take 50 mg by mouth every 8 (eight) hours as needed.  1    triamcinolone acetonide 0.1% (KENALOG) 0.1 % cream          Objective Findings:    Vital Signs:There were no vitals filed for this visit.        Physical Exam:  General Appearance: Well appearing in no acute distress  Eyes:    No scleral icterus  ENT: Neck supple, Lips, mucosa, and tongue normal; teeth and gums normal  Lungs: CTA bilaterally in anterior and posterior fields, no wheezes, no crackles.  Heart:  Regular  rate, S1, S2 normal, no murmurs heard.  Abdomen: Soft, non tender, non distended with normal bowel sounds. No hepatosplenomegaly, ascites, or mass.  Extremities: No clubbing, cyanosis or edema  Skin: No rash    Labs:  Reviewed    Plan:Colonoscopy  I have explained the risks and benefits of endoscopy procedures to the patient including but not limited to bleeding, perforation, infection, and death. The patient wishes to proceed.

## 2017-12-28 NOTE — TRANSFER OF CARE
"Anesthesia Transfer of Care Note    Patient: Terri Herbert    Procedure(s) Performed: Procedure(s) (LRB):  COLONOSCOPY (N/A)    Patient location: PACU    Anesthesia Type: general    Transport from OR: Transported from OR on room air with adequate spontaneous ventilation    Post pain: adequate analgesia    Post assessment: no apparent anesthetic complications    Post vital signs: stable    Level of consciousness: awake, alert and oriented    Nausea/Vomiting: no nausea/vomiting    Complications: none    Transfer of care protocol was followed      Last vitals:   Visit Vitals  BP (!) 140/97 (BP Location: Left arm, Patient Position: Lying)   Pulse 84   Temp 36.7 °C (98.1 °F) (Oral)   Resp 18   Ht 5' 5" (1.651 m)   Wt (!) 167.4 kg (369 lb)   SpO2 98%   Breastfeeding? No   BMI 61.40 kg/m²     "

## 2017-12-28 NOTE — INTERVAL H&P NOTE
The patient has been examined and the H&P has been reviewed:I have reviewed this note and I agree with this assessment. The patient was seen in the office and remains stable for endoscopy at the time of this present evaluation.         Anesthesia/Surgery risks, benefits and alternative options discussed and understood by patient/family.          Active Hospital Problems    Diagnosis  POA    Multiple gastric ulcers [K25.9]  Yes      Resolved Hospital Problems    Diagnosis Date Resolved POA   No resolved problems to display.

## 2017-12-28 NOTE — PLAN OF CARE
Dr Gonzalez came to bedside and discussed findings. NO N/V,  no abdominal pain, no GI bleeding, and vitals stable.  Pt discharged from unit.

## 2017-12-28 NOTE — ANESTHESIA RELEASE NOTE
"Anesthesia Release from PACU Note    Patient: Terri Herbert    Procedure(s) Performed: Procedure(s) (LRB):  COLONOSCOPY (N/A)    Anesthesia type: MAC    Post pain: Adequate analgesia    Post assessment: no apparent anesthetic complications, tolerated procedure well and no evidence of recall    Last Vitals:   Visit Vitals  BP (!) 140/97 (BP Location: Left arm, Patient Position: Lying)   Pulse 84   Temp 36.7 °C (98.1 °F) (Oral)   Resp 18   Ht 5' 5" (1.651 m)   Wt (!) 167.4 kg (369 lb)   SpO2 98%   Breastfeeding? No   BMI 61.40 kg/m²       Post vital signs: stable    Level of consciousness: awake, alert  and oriented    Nausea/Vomiting: no nausea/no vomiting    Complications: none    Airway Patency: patent    Respiratory: unassisted, spontaneous ventilation, room air    Cardiovascular: stable and blood pressure at baseline    Hydration: euvolemic  "

## 2017-12-28 NOTE — DISCHARGE INSTRUCTIONS

## 2017-12-28 NOTE — ANESTHESIA PREPROCEDURE EVALUATION
12/28/2017  Terri Herbert is a 57 y.o., female.    Pre-op Assessment    I have reviewed the Patient Summary Reports.     I have reviewed the Nursing Notes.   I have reviewed the Medications.     Review of Systems  Anesthesia Hx:  No problems with previous Anesthesia  Denies Family Hx of Anesthesia complications.   Denies Personal Hx of Anesthesia complications.   Social:  Non-Smoker    Cardiovascular:   Exercise tolerance: good Hypertension, well controlled    Pulmonary:  Pulmonary Normal    Renal/:  Renal/ Normal     Hepatic/GI:   PUD, GERD, well controlled    Neurological:  Neurology Normal    Endocrine:  Endocrine Normal    Psych:   depression          Physical Exam  General:  Morbid Obesity    Airway/Jaw/Neck:  Airway Findings: Mouth Opening: Normal General Airway Assessment: Adult  Mallampati: II       Chest/Lungs:  Chest/Lungs Findings: Clear to auscultation     Heart/Vascular:  Heart Findings: Rate: Normal             Anesthesia Plan  Type of Anesthesia, risks & benefits discussed:  Anesthesia Type:  MAC  Patient's Preference:   Intra-op Monitoring Plan:   Intra-op Monitoring Plan Comments:   Post Op Pain Control Plan:   Post Op Pain Control Plan Comments:   Induction:   IV  Beta Blocker:  Patient is not currently on a Beta-Blocker (No further documentation required).       Informed Consent: Patient understands risks and agrees with Anesthesia plan.  Questions answered. Anesthesia consent signed with patient.  ASA Score: 3     Day of Surgery Review of History & Physical: I have interviewed and examined the patient. I have reviewed the patient's H&P dated:  There are no significant changes.

## 2017-12-29 VITALS
WEIGHT: 293 LBS | BODY MASS INDEX: 48.82 KG/M2 | RESPIRATION RATE: 18 BRPM | OXYGEN SATURATION: 99 % | SYSTOLIC BLOOD PRESSURE: 123 MMHG | DIASTOLIC BLOOD PRESSURE: 72 MMHG | TEMPERATURE: 98 F | HEIGHT: 65 IN | HEART RATE: 78 BPM

## 2018-02-06 ENCOUNTER — OFFICE VISIT (OUTPATIENT)
Dept: INTERNAL MEDICINE | Facility: CLINIC | Age: 58
End: 2018-02-06
Payer: MEDICAID

## 2018-02-06 ENCOUNTER — PATIENT MESSAGE (OUTPATIENT)
Dept: INTERNAL MEDICINE | Facility: CLINIC | Age: 58
End: 2018-02-06

## 2018-02-06 ENCOUNTER — LAB VISIT (OUTPATIENT)
Dept: LAB | Facility: HOSPITAL | Age: 58
End: 2018-02-06
Attending: FAMILY MEDICINE
Payer: MEDICAID

## 2018-02-06 VITALS
SYSTOLIC BLOOD PRESSURE: 139 MMHG | DIASTOLIC BLOOD PRESSURE: 88 MMHG | WEIGHT: 293 LBS | BODY MASS INDEX: 48.82 KG/M2 | HEIGHT: 65 IN | HEART RATE: 64 BPM

## 2018-02-06 DIAGNOSIS — K21.9 GASTROESOPHAGEAL REFLUX DISEASE, ESOPHAGITIS PRESENCE NOT SPECIFIED: ICD-10-CM

## 2018-02-06 DIAGNOSIS — F32.A DEPRESSION, UNSPECIFIED DEPRESSION TYPE: ICD-10-CM

## 2018-02-06 DIAGNOSIS — E66.01 MORBID OBESITY: ICD-10-CM

## 2018-02-06 DIAGNOSIS — G89.29 CHRONIC LOW BACK PAIN WITH RIGHT-SIDED SCIATICA, UNSPECIFIED BACK PAIN LATERALITY: ICD-10-CM

## 2018-02-06 DIAGNOSIS — Z11.59 NEED FOR HEPATITIS C SCREENING TEST: ICD-10-CM

## 2018-02-06 DIAGNOSIS — Z00.00 REGULAR CHECK-UP: ICD-10-CM

## 2018-02-06 DIAGNOSIS — Z13.220 SCREENING FOR HYPERLIPIDEMIA: ICD-10-CM

## 2018-02-06 DIAGNOSIS — Z00.00 REGULAR CHECK-UP: Primary | ICD-10-CM

## 2018-02-06 DIAGNOSIS — I10 HYPERTENSION, UNSPECIFIED TYPE: ICD-10-CM

## 2018-02-06 DIAGNOSIS — M17.0 OSTEOARTHRITIS OF BOTH KNEES, UNSPECIFIED OSTEOARTHRITIS TYPE: ICD-10-CM

## 2018-02-06 DIAGNOSIS — N39.41 URGE INCONTINENCE OF URINE: ICD-10-CM

## 2018-02-06 DIAGNOSIS — M54.41 CHRONIC LOW BACK PAIN WITH RIGHT-SIDED SCIATICA, UNSPECIFIED BACK PAIN LATERALITY: ICD-10-CM

## 2018-02-06 DIAGNOSIS — K25.9 GASTRIC ULCER, UNSPECIFIED CHRONICITY, UNSPECIFIED WHETHER GASTRIC ULCER HEMORRHAGE OR PERFORATION PRESENT: ICD-10-CM

## 2018-02-06 LAB
ALBUMIN SERPL BCP-MCNC: 3.5 G/DL
ALP SERPL-CCNC: 87 U/L
ALT SERPL W/O P-5'-P-CCNC: 15 U/L
ANION GAP SERPL CALC-SCNC: 7 MMOL/L
AST SERPL-CCNC: 17 U/L
BASOPHILS # BLD AUTO: 0.02 K/UL
BASOPHILS NFR BLD: 0.3 %
BILIRUB SERPL-MCNC: 0.5 MG/DL
BUN SERPL-MCNC: 17 MG/DL
CALCIUM SERPL-MCNC: 9.5 MG/DL
CHLORIDE SERPL-SCNC: 108 MMOL/L
CHOLEST SERPL-MCNC: 202 MG/DL
CHOLEST/HDLC SERPL: 3.4 {RATIO}
CO2 SERPL-SCNC: 30 MMOL/L
CREAT SERPL-MCNC: 0.8 MG/DL
DIFFERENTIAL METHOD: NORMAL
EOSINOPHIL # BLD AUTO: 0.3 K/UL
EOSINOPHIL NFR BLD: 4.5 %
ERYTHROCYTE [DISTWIDTH] IN BLOOD BY AUTOMATED COUNT: 13.2 %
EST. GFR  (AFRICAN AMERICAN): >60 ML/MIN/1.73 M^2
EST. GFR  (NON AFRICAN AMERICAN): >60 ML/MIN/1.73 M^2
GLUCOSE SERPL-MCNC: 97 MG/DL
HCT VFR BLD AUTO: 39.3 %
HDLC SERPL-MCNC: 59 MG/DL
HDLC SERPL: 29.2 %
HGB BLD-MCNC: 12.7 G/DL
IMM GRANULOCYTES # BLD AUTO: 0.03 K/UL
IMM GRANULOCYTES NFR BLD AUTO: 0.4 %
LDLC SERPL CALC-MCNC: 123.4 MG/DL
LYMPHOCYTES # BLD AUTO: 1.8 K/UL
LYMPHOCYTES NFR BLD: 25.6 %
MCH RBC QN AUTO: 29.9 PG
MCHC RBC AUTO-ENTMCNC: 32.3 G/DL
MCV RBC AUTO: 93 FL
MONOCYTES # BLD AUTO: 0.7 K/UL
MONOCYTES NFR BLD: 9.2 %
NEUTROPHILS # BLD AUTO: 4.2 K/UL
NEUTROPHILS NFR BLD: 60 %
NONHDLC SERPL-MCNC: 143 MG/DL
NRBC BLD-RTO: 0 /100 WBC
PLATELET # BLD AUTO: 234 K/UL
PMV BLD AUTO: 10.3 FL
POTASSIUM SERPL-SCNC: 4.4 MMOL/L
PROT SERPL-MCNC: 7 G/DL
RBC # BLD AUTO: 4.25 M/UL
SODIUM SERPL-SCNC: 145 MMOL/L
TRIGL SERPL-MCNC: 98 MG/DL
TSH SERPL DL<=0.005 MIU/L-ACNC: 2.11 UIU/ML
WBC # BLD AUTO: 7.06 K/UL

## 2018-02-06 PROCEDURE — 80061 LIPID PANEL: CPT

## 2018-02-06 PROCEDURE — 85025 COMPLETE CBC W/AUTO DIFF WBC: CPT

## 2018-02-06 PROCEDURE — 3008F BODY MASS INDEX DOCD: CPT | Mod: ,,, | Performed by: FAMILY MEDICINE

## 2018-02-06 PROCEDURE — 86803 HEPATITIS C AB TEST: CPT

## 2018-02-06 PROCEDURE — 36415 COLL VENOUS BLD VENIPUNCTURE: CPT

## 2018-02-06 PROCEDURE — 99396 PREV VISIT EST AGE 40-64: CPT | Mod: S$PBB,,, | Performed by: FAMILY MEDICINE

## 2018-02-06 PROCEDURE — 84443 ASSAY THYROID STIM HORMONE: CPT

## 2018-02-06 PROCEDURE — 99999 PR PBB SHADOW E&M-EST. PATIENT-LVL IV: CPT | Mod: PBBFAC,,, | Performed by: FAMILY MEDICINE

## 2018-02-06 PROCEDURE — 80053 COMPREHEN METABOLIC PANEL: CPT

## 2018-02-06 PROCEDURE — 99214 OFFICE O/P EST MOD 30 MIN: CPT | Mod: PBBFAC | Performed by: FAMILY MEDICINE

## 2018-02-06 NOTE — PROGRESS NOTES
"Subjective:       Patient ID: Terri Herbert is a 57 y.o. female.    Chief Complaint: No chief complaint on file.    HPI     58 yo    Presents for a wellness visit    Since our last visit  Has found to have stomach ulcers and grade D esophagitis.  She also reports had "esophagus" stretched.    Has taken Pantoprazole - doing well on medication.  No chocking incidence, no more blood from upper GI.     Has some urge incontinence  Takes oxybutynin - for incontinence issues - leakage before she gets to the Bathroom.  Has had vaginal delivery.  Leakage comes/ goes - started about 3 months ago.  No leak with cough/ sneeze / laugh.  Finds comes on with urge quickly and cannot wait.    Obesity  Actively seeking to lose weight.    Mammogram and Pap scheduled for april      Review of Systems   Constitutional: Negative for chills and fever.   HENT: Negative for trouble swallowing.    Eyes: Negative for visual disturbance.   Respiratory: Negative for shortness of breath.    Cardiovascular: Negative for chest pain.   Gastrointestinal: Negative for constipation and diarrhea.   Musculoskeletal: Positive for arthralgias.   Skin: Negative for color change.   Neurological: Negative for dizziness and light-headedness.       Objective:       Vitals:    02/06/18 0713   BP: 139/88   Pulse: 64       Physical Exam   Constitutional: She is oriented to person, place, and time. She appears well-developed and well-nourished. She does not have a sickly appearance. No distress.   HENT:   Head: Normocephalic and atraumatic.   Right Ear: External ear normal.   Left Ear: External ear normal.   Eyes: Conjunctivae, EOM and lids are normal.   Neck: Trachea normal, normal range of motion and full passive range of motion without pain.   Cardiovascular: Normal rate, regular rhythm and normal heart sounds.    Pulmonary/Chest: Effort normal and breath sounds normal. No stridor. No respiratory distress.   Abdominal: Soft. Normal appearance and bowel sounds " are normal. She exhibits no distension. There is no tenderness. There is no guarding. No hernia.   Musculoskeletal: Normal range of motion.   Neurological: She is alert and oriented to person, place, and time. She is not disoriented.   Skin: Skin is warm, dry and intact. No rash noted. She is not diaphoretic.   Psychiatric: She has a normal mood and affect. Her speech is normal and behavior is normal. Thought content normal.       Assessment:       1. Regular check-up    2. Morbid obesity    3. Urge incontinence of urine    4. Osteoarthritis of both knees, unspecified osteoarthritis type    5. Chronic low back pain with right-sided sciatica, unspecified back pain laterality    6. Hypertension, unspecified type    7. Depression, unspecified depression type    8. Gastroesophageal reflux disease, esophagitis presence not specified    9. Screening for hyperlipidemia    10. Need for hepatitis C screening test    11. Gastric ulcer, unspecified chronicity, unspecified whether gastric ulcer hemorrhage or perforation present        Plan:   Regular check-up    Morbid obesity  Weight mngt consult placed as insurance will pay for 10 visit of weight watchers. Strongly encouraged.    Urge incontinence of urine  PT placed for bladder training / pelvic muscle PT    Osteoarthritis of both knees, unspecified osteoarthritis type  Avoid NSAIDs - including Mobic  Try acetaminophen.  Try OTC cream    Chronic low back pain with right-sided sciatica, unspecified back pain laterality    Gabapentin for sciatica      Hypertension, unspecified type    Controlled on losartan 100 and HCTZ 12.5    Depression, unspecified depression type    Buproprion    Gastroesophageal reflux disease, esophagitis presence not specified    Feels much improved with PPI    Screening for hyperlipidemia    Lipid panel    Need for hepatitis C screening test    Gastric ulcer, unspecified chronicity, unspecified whether gastric ulcer hemorrhage or perforation  present    Following GI - s/p 2 scopes. Feels improved. Avoiding NSAIDS      F/u prn - based of labs - or 6 mo    No Follow-up on file.

## 2018-02-07 LAB — HCV AB SERPL QL IA: NEGATIVE

## 2018-02-09 ENCOUNTER — PATIENT OUTREACH (OUTPATIENT)
Dept: ADMINISTRATIVE | Facility: HOSPITAL | Age: 58
End: 2018-02-09

## 2018-02-09 NOTE — PROGRESS NOTES
Call to schedule annual mammogram and other outstanding health maintenance. Patient not available, last year mammogram and pap performed at [facility] and [facility]. Patient switching providers in April 2018 and will have mammogram [facility]. Patient given our fax number to send record after both mammogram and pap smear. Patient in agreement and vocalize understanding.

## 2018-02-15 ENCOUNTER — CLINICAL SUPPORT (OUTPATIENT)
Dept: REHABILITATION | Facility: HOSPITAL | Age: 58
End: 2018-02-15
Payer: MEDICAID

## 2018-02-15 DIAGNOSIS — N39.41 URGE INCONTINENCE OF URINE: Primary | ICD-10-CM

## 2018-02-15 PROCEDURE — 97161 PT EVAL LOW COMPLEX 20 MIN: CPT

## 2018-02-15 NOTE — PROGRESS NOTES
Patient: Terri DUTTON [facility] #:  [id number]    Date of treatment: 02/15/2018   # Visits: 1/10      Outpatient Physical Therapy   Initial Evaluation    Terri is a 57 y.o. female evaluated on 02/15/2018    Physician:  Henry Jaramillo MD   Diagnosis:   Encounter Diagnosis   Name Primary?    Urge incontinence of urine Yes        Treatment ordered: Physical Therapy    Medical History:   Past Medical History:   Diagnosis Date    Glaucoma     rod 2017    H/O tubal ligation 1981    History of arthroscopy of both knees 2001,1996    History of tonsillectomy 1978    Hypertension         Surgical History:   Past Surgical History:   Procedure Laterality Date    COLONOSCOPY N/A 12/28/2017    Procedure: COLONOSCOPY;  Surgeon: Galen Gonzalez III, MD;  Location: [facility];  Service: Endoscopy;  Laterality: N/A;    EYE SURGERY Bilateral 01/2017    Lens replacement    knees Bilateral 1996 & 2004    TONSILLECTOMY      TUBAL LIGATION  1981        Medications:   Current Outpatient Prescriptions   Medication Sig    buPROPion (WELLBUTRIN XL) 150 MG TB24 tablet Take 300 mg by mouth once daily.     diclofenac sodium 1 % Gel Apply 2 g topically 4 (four) times daily.    gabapentin (NEURONTIN) 300 MG capsule Take 300 mg by mouth 3 (three) times daily.    hydrochlorothiazide (MICROZIDE) 12.5 mg capsule Take 12.5 mg by mouth once daily.    lansoprazole (PREVACID) 30 MG capsule Take 30 mg by mouth.    losartan (COZAAR) 50 MG tablet Take 100 mg by mouth.     meloxicam (MOBIC) 7.5 MG tablet Take 7.5 mg by mouth once daily.    methocarbamol (ROBAXIN) 750 MG Tab TAKE 1 TABLET AT BEDTIME AS NEEDED FOR ANXIETY    pantoprazole (PROTONIX) 40 MG tablet Take 1 tablet (40 mg total) by mouth once daily.    traMADol (ULTRAM) 50 mg tablet Take 50 mg by mouth every 8 (eight) hours as needed.    triamcinolone acetonide 0.1% (KENALOG) 0.1 % cream      No current facility-administered medications for this visit.        Allergies:  Review of patient's allergies indicates:  No Known Allergies     Precautions: No pelvic floor precautions noted    OB/GYN History:   1981 Pt had normal vaginal uncomplicated delivery, tubes tied    Bladder/Bowel History: Urge urinary incontinence episode approx 3 years ago, but resolved on its own. This episode started approx 3 months ago, gradually worsened from a few drops to a few tsp, notes no other symptoms other than urgency and incontinence when trying to make it to the restroom. No history of bowel dysfunction    SUBJECTIVE  Date of onset:Approximately 3 months ago.   History of current complaint: Urgency with incontinence, sudden onset with gradual worsening. Did have a diagnosis of a UTI which was treated with an antibiotic, but symptoms of incontinence did not improve. She has been on oxybutynin x 2 months with no change in symptoms per patient    Pain: Patient reports back pain and pressure down into groin area bilaterally 4/10 currently,8/10 at worst and 2/10 at best, with 0 being the lowest and 10 being the highest. Worse with walking a lot, picking up any objects, night time getting out of bed,     Activities that cause symptoms: strong urge to go, walking to the toilet, full bladder, changing positions (i.e. Sit<>stand),    Previous treatment included medication only    Sexually active? no    Frequency of urination: Daytime: every hour     Nighttime: at least 2 x during nichgt of 6 hours of sleep  Difficulty initiating urine stream:  no  Urine stream: normal  Complete emptying: sometimes, others she feels as she must sit for prolonged time to fully empty the bladder  Bladder leakage: yes  Frequency of incidents: every time getting to bathroom   Amount leaked: teaspoons    Frequency of bowel movements: once daily  Difficulty initiating BM:  no   Quality/Shape of BM: 3-4 on Harrison scale  Colon leakage: single selection list no  Frequency of incidents: none  Amount leaked: none    Form of protection:  single selection list pad  Number of pads required in 24 hours: 2    Types of fluid intake: multiple selection list  1-2 C of coffee in morning, water 6, 16 oz bottles of water/day, and cokes during day, 2 at most a day,   Diet: brunch foods and snack through the day higher fiber foods and trying to eat more fruits and veggies.   Current exercise: not a lot bc of knees.     Occupation: Pt works is unemployed and job-related duties include household activities: laundry, light housekeeping, cooking, etc.    OBJECTIVE    Pelvic Disability Index  (PFDI-20): 52%  ORTHO SCREEN  Posture: multiple selection list slight flexed posturing, increased lumbar lordosis, genu varum R>L, pt has history of severe knee OA with scopes which also limit her mobility, especially sit<>Stand, walking and stairs, low back pain onset with approx 40% flexion, increased with 20% extension and hinging noted at L3,  worse with right side bending >L  Pelvic alignment: flexed through sacrum, elevated R pelvis,    ABDOMINALS  Scarring: none  Diastasis: none  Abdominal strength:  Rectus: weak     TA: minimal functional control  Chaperone: none, offered and refused  Consent signed: Yes    VAGINAL PELVIC FLOOR EXAM    EXTERNAL ASSESSMENT  Introitus: gaping  Skin condition: WNL  Scarring: None  Sensation: slight diminished  Pain:Tender at R adductor attachment, B obturator internus and B piriformis R>L  Voluntary contraction: visible lift but weak with use of accessory mm in LE   Voluntary relaxation: return to baseline  Involuntary contraction: with cough,   Bearing down: bulge present but diminished  Perineal descent: present      INTERNAL ASSESSMENT  Pain: tender points at R deep transverse perineal, B obturator internus R>L (recreated pelvic groin pain)  Sensation:   diminished ability to localize pressure   Vaginal vault: roomy, asymmetrical- larger on the right   Muscle Bulk: tone greater on R>L but still WNL  Muscle Power: 3/5  Muscle Endurance:  5 sec  # Reps To Fatigue: 4    Fast Contractions: 4    Quality of contraction: WNL but difficulty maintaining multiple  Specificity: Pt uses adductor of BLE   Coordination: cannot isolate PFM from abdominals, cannot co-contract PFM and abdominals  Prolapse check: grade 1, cystocyle noted            SEMG EVALUATION: Deferred due to time constraints  PF Electrode placement:  external perineal, vaginal probe, rectal probe, ) - To Add external perianal  Pt. Position: supine, supine hooklying, semi-Mocks sidelying knees to chest, sitting, standing) multiple - To Add   Findings:            TREATMENT    Evaluation x 40 minutes     Therapeutic Exercise to develop multiple selection list (strength, ROM, endurance, postural control, core stabilization) for 0 minutes including:  Multiple selection list (kegels with assist of SEMG, voiding trial, abdominal sets, OI/adductor sets, glut sets, bridging, kegels with squat, abdominal bracing, bearing down with abdominal release, diaphragmatic breathing, diaphragmatic breathing with kegel, passive stretching of )    Neuromuscular Re-education to develop multiple selection list (balance, coordination) for 0 minutes including:  Multiple selection list (kegels with assist of SEMG, bearing down with abdominal release, diaphragmatic breathing, diaphragmatic breathing with kegel, )    Manual Therapy  None today  Modalities none        Education: instructed on general anatomy/physiology of urinary/bowel system; discussed plan of care with patient; instructed in benefits/risks of treatment; instructed in alternative methods of treatment; instructed in risks of refusing treatment; patient a parent agreed to treatment plan. Also educated in multiple selection list (bladder irritants, posture/body mechanics, vulvar irritants, hygiene of perineum, bladder retraining, diaphragmatic breathing, isometric abdominal exercises, kegels, proper bearing down techniques, fluid intake/dietary  modifications, behavior modifications to reduce trips to bathroom, educated pt on quick flicks when feeling urge to reduce incontinence episodes on the way to the bathroom.       History  Co-morbidities and personal factors that may impact the plan of care Examination  Body Structures and Functions, activity limitations and participation restrictions that may impact the plan of care Clinical Presentation   Decision Making/ Complexity Score   Co-morbidities:     Obesity  Arthritis  HTN (on diuretic medication)            Personal Factors:   Decreased functional mobility Body Regions: BLE weakness including all hip function, lumbopelvic musculature and joint function    Body Systems:   Musculoskeletal  urinary        Activity limitations:   Walking, transitional movements, stairs    Participation Restrictions:   Body habitus       Difficulty walking due to LE/lumbopelvic pain and weakness     Urge urinary incontinence due to pelvic floor weakness, decreased pelvic floor muscle endurance and behaviors of frequency          ASSESSMENT                                                                          This is a 57 y.o. female referred to outpatient physical therapy and presents with a medical diagnosis of urge incontinence. Patient will benefit from skilled physical therapy to reduce urinary dysfunction and improve episodes of incontinence to allow pt to resume normal daily activities.    Problem list: altered posture, pelvic asymmetry, poor knowledge of body mechanics and posture,  poor trunk stability, decreased pelvic muscle strength, decreased endurance of the pelvic muscles, decreased phasic ability of the pelvic muscles, increased tension of the pelvic muscles, poor quality of pelvic muscle contraction, unable to co-contract or co-relax abdominal wall and pelvic floor muscles, increased frequency of urination, increased nocturia, dysfunctional voiding      Barriers to Learning: none  noted  Educational/Spiritual/Cultural needs: none  Environmental Barriers: none noted  Abuse/Neglect: no signs    Fall Risk: moderate due to LE dysfunction    FUNCTIONAL GOALS  Short Term Goals: 1 month  1. Pt will verbalize improved awareness of PFM activity as palpated by PT in order to improve activity involvement with HEP.  2. Pt will tolerate HEP to improve impairments and independence with ADL's.    Long Term Goals: 2 months  Multiple selection list (Patient able to participate in exercise or strenuous tasks with less leakage of urine.   Patient able to perform basic ADLs with less leakage of urine.  Patient to report increased frequency of waking up dry.  Decreased need for pad,changes due to incontinence during the day or night.  Able to maintain normal breathing pattern during pelvic floor muscle contraction.  Appropriate type and amount of fluid intake.  Able to maintain voiding interval of 2-3 hours for home or work activities.  Patient to be able to tolerate speculum exam for increased access to womens healthcare.  Patient to be I with self mgmt. of symptoms.  I with HEP.    Pt able to make trip to toilet without episode of incontinence    PATIENTS GOALS: Decrease or eliminate incontinence if possible    PLAN  Patient will be treated in physical therapy for multiple selection list patient education, HEP, therapeutic exercise, neuromuscular re-education, therapeutic activity, gait training, manual therapy, modalities  to achieve established goals.    Frequency/duration: 1-2 x/week x 4-6 weeks

## 2018-02-20 ENCOUNTER — PATIENT MESSAGE (OUTPATIENT)
Dept: INTERNAL MEDICINE | Facility: CLINIC | Age: 58
End: 2018-02-20

## 2018-02-22 RX ORDER — MELOXICAM 7.5 MG/1
7.5 TABLET ORAL DAILY
Qty: 30 TABLET | Refills: 0 | Status: SHIPPED | OUTPATIENT
Start: 2018-02-22 | End: 2018-03-12 | Stop reason: SDUPTHER

## 2018-03-09 ENCOUNTER — PATIENT MESSAGE (OUTPATIENT)
Dept: INTERNAL MEDICINE | Facility: CLINIC | Age: 58
End: 2018-03-09

## 2018-03-12 RX ORDER — MELOXICAM 7.5 MG/1
7.5 TABLET ORAL DAILY
Qty: 30 TABLET | Refills: 0 | Status: SHIPPED | OUTPATIENT
Start: 2018-03-12 | End: 2018-03-13 | Stop reason: SDUPTHER

## 2018-03-13 RX ORDER — MELOXICAM 7.5 MG/1
7.5 TABLET ORAL DAILY
Qty: 30 TABLET | Refills: 0 | Status: SHIPPED | OUTPATIENT
Start: 2018-03-13 | End: 2019-02-13 | Stop reason: SDUPTHER

## 2018-04-02 ENCOUNTER — PATIENT MESSAGE (OUTPATIENT)
Dept: ADMINISTRATIVE | Facility: HOSPITAL | Age: 58
End: 2018-04-02

## 2018-04-05 ENCOUNTER — DOCUMENTATION ONLY (OUTPATIENT)
Dept: ADMINISTRATIVE | Facility: HOSPITAL | Age: 58
End: 2018-04-05

## 2018-04-11 RX ORDER — BUPROPION HYDROCHLORIDE 150 MG/1
300 TABLET ORAL DAILY
Qty: 180 TABLET | Refills: 1 | Status: SHIPPED | OUTPATIENT
Start: 2018-04-11 | End: 2018-04-17 | Stop reason: DRUGHIGH

## 2018-04-17 ENCOUNTER — TELEPHONE (OUTPATIENT)
Dept: INTERNAL MEDICINE | Facility: CLINIC | Age: 58
End: 2018-04-17

## 2018-04-17 ENCOUNTER — PATIENT MESSAGE (OUTPATIENT)
Dept: ADMINISTRATIVE | Facility: HOSPITAL | Age: 58
End: 2018-04-17

## 2018-04-17 RX ORDER — BUPROPION HYDROCHLORIDE 300 MG/1
300 TABLET ORAL DAILY
Qty: 30 TABLET | Refills: 6 | Status: SHIPPED | OUTPATIENT
Start: 2018-04-17 | End: 2018-11-30 | Stop reason: SDUPTHER

## 2018-04-17 NOTE — TELEPHONE ENCOUNTER
This is a patient of Dr. Jaramillo, whom I just refilled the medication.  Please forward to him.   I assume we just need to change to 300 XL, but I will defer to Dr. Jaramillo for clinical decision making.

## 2018-04-17 NOTE — TELEPHONE ENCOUNTER
Insurance states that they will not pay for two tabs daily. They are asking that we change Wellbutrin xl 150 to 300.

## 2018-05-07 ENCOUNTER — TELEPHONE (OUTPATIENT)
Dept: INTERNAL MEDICINE | Facility: CLINIC | Age: 58
End: 2018-05-07

## 2018-05-07 NOTE — TELEPHONE ENCOUNTER
----- Message from Mone Garcia sent at 5/7/2018  8:09 AM CDT -----  pls work pt in wed morning for body rash..[phone]

## 2018-05-10 ENCOUNTER — OFFICE VISIT (OUTPATIENT)
Dept: INTERNAL MEDICINE | Facility: CLINIC | Age: 58
End: 2018-05-10
Payer: MEDICAID

## 2018-05-10 VITALS
DIASTOLIC BLOOD PRESSURE: 84 MMHG | HEIGHT: 65 IN | BODY MASS INDEX: 48.82 KG/M2 | OXYGEN SATURATION: 97 % | HEART RATE: 75 BPM | TEMPERATURE: 98 F | WEIGHT: 293 LBS | SYSTOLIC BLOOD PRESSURE: 140 MMHG

## 2018-05-10 DIAGNOSIS — M54.41 CHRONIC LOW BACK PAIN WITH RIGHT-SIDED SCIATICA, UNSPECIFIED BACK PAIN LATERALITY: ICD-10-CM

## 2018-05-10 DIAGNOSIS — L30.9 DERMATITIS: Primary | ICD-10-CM

## 2018-05-10 DIAGNOSIS — G89.29 CHRONIC LOW BACK PAIN WITH RIGHT-SIDED SCIATICA, UNSPECIFIED BACK PAIN LATERALITY: ICD-10-CM

## 2018-05-10 PROCEDURE — 99214 OFFICE O/P EST MOD 30 MIN: CPT | Mod: PBBFAC | Performed by: FAMILY MEDICINE

## 2018-05-10 PROCEDURE — 99999 PR PBB SHADOW E&M-EST. PATIENT-LVL IV: CPT | Mod: PBBFAC,,, | Performed by: FAMILY MEDICINE

## 2018-05-10 PROCEDURE — 99213 OFFICE O/P EST LOW 20 MIN: CPT | Mod: S$PBB,,, | Performed by: FAMILY MEDICINE

## 2018-05-10 RX ORDER — TRAMADOL HYDROCHLORIDE 50 MG/1
50 TABLET ORAL EVERY 8 HOURS PRN
Qty: 60 TABLET | Refills: 0 | Status: SHIPPED | OUTPATIENT
Start: 2018-05-10 | End: 2018-06-09

## 2018-05-10 RX ORDER — HYDROXYZINE HYDROCHLORIDE 50 MG/1
25 TABLET, FILM COATED ORAL 3 TIMES DAILY PRN
Qty: 45 TABLET | Refills: 0 | Status: SHIPPED | OUTPATIENT
Start: 2018-05-10 | End: 2020-01-23

## 2018-05-10 NOTE — PROGRESS NOTES
Subjective:       Patient ID: Terri Herbert is a 57 y.o. female.    Chief Complaint: Rash    HPI     56 yo    Rash  All over    Has been using steroid cream.  Helps keep controlled    Has been staying with Mother.   New soap, detergent, linens exposures.    No one else itching        Review of Systems   Constitutional: Negative for chills and fever.   HENT: Negative for congestion, sinus pressure and voice change.    Eyes: Negative for visual disturbance.   Respiratory: Negative for shortness of breath.    Cardiovascular: Negative for chest pain.   Gastrointestinal: Negative for constipation and diarrhea.   Genitourinary: Negative for difficulty urinating.   Musculoskeletal: Negative for gait problem and myalgias.   Skin: Negative for rash.   Neurological: Negative for dizziness and light-headedness.   Psychiatric/Behavioral: Negative for dysphoric mood.       Objective:       Vitals:    05/10/18 0937   BP: (!) 140/84   Pulse: 75   Temp: 97.5 °F (36.4 °C)           Physical Exam   Skin:   Small erythemous lesions across upper body.  Some dry skin appreciated.  Itch marks.         Assessment:       1. Dermatitis    2. Chronic low back pain with right-sided sciatica, unspecified back pain laterality        Plan:   Dermatitis    Suspect contact dermatitis.  Staying at mother with new shampoo/ linen/ detergent  No one else has rash/ itches  -moisturizer advised.  - using triamcinolone cream.   Antihistamine for itch.  Consult dermatology - if improves once leaves mothers house she can cancel that appt.     -     hydrOXYzine (ATARAX) 50 MG tablet; Take 0.5 tablets (25 mg total) by mouth 3 (three) times daily as needed for Itching.  Dispense: 45 tablet; Refill: 0    Chronic low back pain  Will refill tramadol one time.  Discussed I do not do chronic pain mangement.  I will not refill this medication  Discussed with patient risk of medication  Encouraged to f/u with Ortho or Pain mngt MD.        No Follow-up on file.

## 2018-05-14 ENCOUNTER — TELEPHONE (OUTPATIENT)
Dept: INTERNAL MEDICINE | Facility: CLINIC | Age: 58
End: 2018-05-14

## 2018-05-14 NOTE — TELEPHONE ENCOUNTER
----- Message from Merissa Gurrola sent at 5/10/2018 10:32 AM CDT -----  Contact: pt   Pt states that the the referral need to be sent to the [facility] due to her insurance.  .[phone] (home)

## 2018-05-14 NOTE — TELEPHONE ENCOUNTER
Called pt to report that I had spoke to Dermatology office at [facility] and they do not need a referral . Gave phone number to patient to call for appt. [phone]

## 2018-05-24 ENCOUNTER — OFFICE VISIT (OUTPATIENT)
Dept: INTERNAL MEDICINE | Facility: CLINIC | Age: 58
End: 2018-05-24
Payer: MEDICAID

## 2018-05-24 VITALS
RESPIRATION RATE: 18 BRPM | OXYGEN SATURATION: 98 % | HEIGHT: 65 IN | BODY MASS INDEX: 48.82 KG/M2 | DIASTOLIC BLOOD PRESSURE: 78 MMHG | HEART RATE: 83 BPM | WEIGHT: 293 LBS | SYSTOLIC BLOOD PRESSURE: 122 MMHG | TEMPERATURE: 99 F

## 2018-05-24 DIAGNOSIS — L02.91 CUTANEOUS ABSCESS, UNSPECIFIED SITE: Primary | ICD-10-CM

## 2018-05-24 DIAGNOSIS — I73.9 PVD (PERIPHERAL VASCULAR DISEASE): ICD-10-CM

## 2018-05-24 PROCEDURE — 99215 OFFICE O/P EST HI 40 MIN: CPT | Mod: PBBFAC,PO | Performed by: NURSE PRACTITIONER

## 2018-05-24 PROCEDURE — 99999 PR PBB SHADOW E&M-EST. PATIENT-LVL V: CPT | Mod: PBBFAC,,, | Performed by: NURSE PRACTITIONER

## 2018-05-24 PROCEDURE — 87070 CULTURE OTHR SPECIMN AEROBIC: CPT

## 2018-05-24 PROCEDURE — 87186 SC STD MICRODIL/AGAR DIL: CPT

## 2018-05-24 PROCEDURE — 99213 OFFICE O/P EST LOW 20 MIN: CPT | Mod: 25,S$PBB,, | Performed by: NURSE PRACTITIONER

## 2018-05-24 PROCEDURE — 87077 CULTURE AEROBIC IDENTIFY: CPT

## 2018-05-24 RX ORDER — CEFTRIAXONE 1 G/1
1 INJECTION, POWDER, FOR SOLUTION INTRAMUSCULAR; INTRAVENOUS
Status: COMPLETED | OUTPATIENT
Start: 2018-05-24 | End: 2018-05-24

## 2018-05-24 RX ORDER — MUPIROCIN 20 MG/G
OINTMENT TOPICAL 2 TIMES DAILY
Qty: 30 G | Refills: 0 | Status: SHIPPED | OUTPATIENT
Start: 2018-05-24 | End: 2018-06-03

## 2018-05-24 RX ORDER — TOLTERODINE 2 MG/1
4 CAPSULE, EXTENDED RELEASE ORAL DAILY
Qty: 30 CAPSULE | Refills: 2 | Status: SHIPPED | OUTPATIENT
Start: 2018-05-24 | End: 2018-05-24 | Stop reason: ALTCHOICE

## 2018-05-24 RX ORDER — OXYBUTYNIN CHLORIDE 5 MG/1
5 TABLET, EXTENDED RELEASE ORAL 3 TIMES DAILY
Qty: 90 TABLET | Refills: 2 | Status: SHIPPED | OUTPATIENT
Start: 2018-05-24 | End: 2019-08-21

## 2018-05-24 RX ORDER — CLINDAMYCIN HYDROCHLORIDE 300 MG/1
300 CAPSULE ORAL EVERY 8 HOURS
Qty: 30 CAPSULE | Refills: 0 | Status: SHIPPED | OUTPATIENT
Start: 2018-05-24 | End: 2019-02-06 | Stop reason: ALTCHOICE

## 2018-05-24 RX ADMIN — CEFTRIAXONE SODIUM 1 G: 1 INJECTION, POWDER, FOR SOLUTION INTRAMUSCULAR; INTRAVENOUS at 03:05

## 2018-05-24 NOTE — PROGRESS NOTES
Ceftriaxone 1 gram IM left ventrogluteal.  Lot# [id number] exp 11/2020 mfg Sandoz.  Pt advised to wait in clinic 15 minutes to monitor for side effects.  Pt voiced understanding and tolerated injection well.

## 2018-05-24 NOTE — PROGRESS NOTES
"Subjective:      Patient ID: Terri Herbert is a 57 y.o. female.    Chief Complaint: boil on leg    HPI:  Patient states for the last several days she has noticed an infected sore on her right lower leg.  She used a black drawing salve on it, says it came to a head and expressed puss and clear exudate.  It is painful, still draining clear fluid.  She has a temp of 99.1, she has a hx of PVD, chronic discolored lower legs.  She says she sees a dermatologist at [facility], is scheduled to see her on Wed of next week.  Also has a sore on her right arm.  She also says she is getting up from her sleep to urinate often, cannot always control her urine, has some incontinence.  Has tried oxybutin in the past, she is having incontinence mildly day and night    Past Medical History:   Diagnosis Date    Glaucoma     rod 2017    H/O tubal ligation 1981    History of arthroscopy of both knees 2001,1996    History of tonsillectomy 1978    Hypertension        Past Surgical History:   Procedure Laterality Date    COLONOSCOPY N/A 12/28/2017    Procedure: COLONOSCOPY;  Surgeon: Galen Gonzalez III, MD;  Location: [facility];  Service: Endoscopy;  Laterality: N/A;    EYE SURGERY Bilateral 01/2017    Lens replacement    knees Bilateral 1996 & 2004    TONSILLECTOMY      TUBAL LIGATION  1981       Lab Results   Component Value Date    WBC 7.06 02/06/2018    HGB 12.7 02/06/2018    HCT 39.3 02/06/2018     02/06/2018    CHOL 202 (H) 02/06/2018    TRIG 98 02/06/2018    HDL 59 02/06/2018    ALT 15 02/06/2018    AST 17 02/06/2018     02/06/2018    K 4.4 02/06/2018     02/06/2018    CREATININE 0.8 02/06/2018    BUN 17 02/06/2018    CO2 30 (H) 02/06/2018    TSH 2.114 02/06/2018       /78   Pulse 83   Temp 99.1 °F (37.3 °C)   Resp 18   Ht 5' 4.96" (1.65 m)   Wt (!) 171 kg (376 lb 15.8 oz)   SpO2 98%   BMI 62.81 kg/m²       Review of Systems   Constitutional: Negative for appetite change, fatigue and unexpected " weight change.   HENT: Negative for congestion, ear pain, postnasal drip, rhinorrhea, sinus pressure, sneezing, sore throat, tinnitus, trouble swallowing and voice change.    Eyes: Negative for pain, discharge, redness, itching and visual disturbance.   Respiratory: Negative for cough, chest tightness, shortness of breath and wheezing.    Cardiovascular: Negative for chest pain, palpitations and leg swelling.   Gastrointestinal: Negative for abdominal distention, abdominal pain, blood in stool, constipation, diarrhea, nausea and vomiting.        No reflux.   Genitourinary: Negative for difficulty urinating, dyspareunia, flank pain, menstrual problem and pelvic pain.   Musculoskeletal: Negative for arthralgias, back pain, myalgias and neck stiffness.   Skin: Negative for color change and rash.        Infected sore   Neurological: Negative for dizziness and headaches.   Psychiatric/Behavioral: Negative for confusion and sleep disturbance. The patient is not nervous/anxious.       Objective:     Physical Exam   Constitutional: She is oriented to person, place, and time. She appears well-developed and well-nourished. No distress.   Musculoskeletal:   Normal gait   Neurological: She is alert and oriented to person, place, and time.   Skin: Skin is warm and dry.   Bilateral chronic dark skin changes to lower legs, chronic edema from PVD, right anterior lower leg has an infected skin ulcer approx 3 cm in diameter, draining clear exudate, redness surrounding it, warm, center is darker in color.  Cleaned with sterile saline, dressed with antibiotic cream, telfa, gauze, coban  Right forearm also has a smaller infected skin lesion   Psychiatric: She has a normal mood and affect. Her behavior is normal.             Assessment:      1. Cutaneous abscess, unspecified site    2. PVD (peripheral vascular disease)      Plan:   Cutaneous abscess, unspecified site  -     CULTURE, AEROBIC  (SPECIFY SOURCE)    PVD (peripheral vascular  disease)    Other orders  -     Discontinue: tolterodine (DETROL LA) 2 MG Cp24; Take 2 capsules (4 mg total) by mouth once daily.  Dispense: 30 capsule; Refill: 2  -     cefTRIAXone injection 1 g; Inject 1 g into the muscle one time.  -     clindamycin (CLEOCIN) 300 MG capsule; Take 1 capsule (300 mg total) by mouth every 8 (eight) hours.  Dispense: 30 capsule; Refill: 0  -     mupirocin (BACTROBAN) 2 % ointment; Apply topically 2 (two) times daily.  Dispense: 30 g; Refill: 0  -     oxybutynin (DITROPAN-XL) 5 MG TR24; Take 1 tablet (5 mg total) by mouth 3 (three) times daily.  Dispense: 90 tablet; Refill: 2    she will increase her ditropan to tid (15 mg ) daily.  To her GYN or urology for no improvement.  Can follow up with her pcp, advised to be seen at an [facility] urgent care on Saturday for reassessment.  To er for any worse symptoms, keep derm appt next week.  Warm compresses to infected leg, elevate legs for the swelling.        Current Outpatient Prescriptions:     buPROPion (WELLBUTRIN XL) 300 MG 24 hr tablet, Take 1 tablet (300 mg total) by mouth once daily., Disp: 30 tablet, Rfl: 6    gabapentin (NEURONTIN) 300 MG capsule, Take 300 mg by mouth 3 (three) times daily., Disp: , Rfl:     hydrochlorothiazide (MICROZIDE) 12.5 mg capsule, Take 12.5 mg by mouth once daily., Disp: , Rfl:     hydrOXYzine (ATARAX) 50 MG tablet, Take 0.5 tablets (25 mg total) by mouth 3 (three) times daily as needed for Itching., Disp: 45 tablet, Rfl: 0    losartan (COZAAR) 50 MG tablet, Take 100 mg by mouth. , Disp: , Rfl:     meloxicam (MOBIC) 7.5 MG tablet, Take 1 tablet (7.5 mg total) by mouth once daily., Disp: 30 tablet, Rfl: 0    pantoprazole (PROTONIX) 40 MG tablet, Take 1 tablet (40 mg total) by mouth once daily., Disp: 30 tablet, Rfl: 11    traMADol (ULTRAM) 50 mg tablet, Take 1 tablet (50 mg total) by mouth every 8 (eight) hours as needed., Disp: 60 tablet, Rfl: 0    triamcinolone acetonide 0.1% (KENALOG) 0.1 %  cream, , Disp: , Rfl:     clindamycin (CLEOCIN) 300 MG capsule, Take 1 capsule (300 mg total) by mouth every 8 (eight) hours., Disp: 30 capsule, Rfl: 0    diclofenac sodium 1 % Gel, Apply 2 g topically 4 (four) times daily., Disp: 1 Tube, Rfl: 3    mupirocin (BACTROBAN) 2 % ointment, Apply topically 2 (two) times daily., Disp: 30 g, Rfl: 0    oxybutynin (DITROPAN-XL) 5 MG TR24, Take 1 tablet (5 mg total) by mouth 3 (three) times daily., Disp: 90 tablet, Rfl: 2  No current facility-administered medications for this visit.

## 2018-05-25 ENCOUNTER — PATIENT MESSAGE (OUTPATIENT)
Dept: INTERNAL MEDICINE | Facility: CLINIC | Age: 58
End: 2018-05-25

## 2018-05-25 ENCOUNTER — DOCUMENTATION ONLY (OUTPATIENT)
Dept: INTERNAL MEDICINE | Facility: CLINIC | Age: 58
End: 2018-05-25

## 2018-05-25 ENCOUNTER — TELEPHONE (OUTPATIENT)
Dept: INTERNAL MEDICINE | Facility: CLINIC | Age: 58
End: 2018-05-25

## 2018-05-25 NOTE — TELEPHONE ENCOUNTER
Called pt informed her that the dose change Oxybutynin 5 mg is not covered by her insurance.  Informed her of NP recommendation to schedule an appointment with her Gyn or PCP.  Pt would like to try Prior Authorization for 1 tablet oral 2 times a day which is covered informed per pharmacy.  Please advise.

## 2018-05-26 LAB — BACTERIA SPEC AEROBE CULT: NORMAL

## 2018-11-02 ENCOUNTER — OFFICE VISIT (OUTPATIENT)
Dept: INTERNAL MEDICINE | Facility: CLINIC | Age: 58
End: 2018-11-02
Payer: MEDICAID

## 2018-11-02 VITALS
HEART RATE: 95 BPM | OXYGEN SATURATION: 96 % | WEIGHT: 293 LBS | DIASTOLIC BLOOD PRESSURE: 96 MMHG | SYSTOLIC BLOOD PRESSURE: 146 MMHG | TEMPERATURE: 98 F | BODY MASS INDEX: 62.92 KG/M2

## 2018-11-02 DIAGNOSIS — B96.89 ACUTE BACTERIAL SINUSITIS: ICD-10-CM

## 2018-11-02 DIAGNOSIS — M12.9 ARTHRITIS, MULTIPLE JOINT INVOLVEMENT: ICD-10-CM

## 2018-11-02 DIAGNOSIS — K27.9 PUD (PEPTIC ULCER DISEASE): ICD-10-CM

## 2018-11-02 DIAGNOSIS — E66.01 MORBID OBESITY WITH BMI OF 60.0-69.9, ADULT: ICD-10-CM

## 2018-11-02 DIAGNOSIS — J01.90 ACUTE BACTERIAL SINUSITIS: ICD-10-CM

## 2018-11-02 DIAGNOSIS — Z79.1 LONG TERM CURRENT USE OF NON-STEROIDAL ANTI-INFLAMMATORIES (NSAID): ICD-10-CM

## 2018-11-02 DIAGNOSIS — G47.00 INSOMNIA, UNSPECIFIED TYPE: ICD-10-CM

## 2018-11-02 DIAGNOSIS — F32.9 MAJOR DEPRESSION, CHRONIC: Primary | ICD-10-CM

## 2018-11-02 DIAGNOSIS — I10 HYPERTENSION GOAL BP (BLOOD PRESSURE) < 140/90: ICD-10-CM

## 2018-11-02 DIAGNOSIS — E78.00 HYPERCHOLESTEREMIA: ICD-10-CM

## 2018-11-02 PROCEDURE — 99999 PR PBB SHADOW E&M-EST. PATIENT-LVL III: CPT | Mod: PBBFAC,,, | Performed by: INTERNAL MEDICINE

## 2018-11-02 PROCEDURE — 99214 OFFICE O/P EST MOD 30 MIN: CPT | Mod: S$PBB,,, | Performed by: INTERNAL MEDICINE

## 2018-11-02 PROCEDURE — 99213 OFFICE O/P EST LOW 20 MIN: CPT | Mod: PBBFAC | Performed by: INTERNAL MEDICINE

## 2018-11-02 RX ORDER — BETAMETHASONE DIPROPIONATE 0.5 MG/G
OINTMENT, AUGMENTED TOPICAL
COMMUNITY
Start: 2018-09-26 | End: 2020-01-23

## 2018-11-02 RX ORDER — LOSARTAN POTASSIUM 100 MG/1
100 TABLET ORAL
COMMUNITY
Start: 2017-12-06 | End: 2018-11-30 | Stop reason: SDUPTHER

## 2018-11-02 RX ORDER — TRAMADOL HYDROCHLORIDE 50 MG/1
50 TABLET ORAL EVERY 8 HOURS PRN
COMMUNITY
Start: 2017-06-28

## 2018-11-02 RX ORDER — AZITHROMYCIN 250 MG/1
TABLET, FILM COATED ORAL
Qty: 6 TABLET | Refills: 0 | Status: SHIPPED | OUTPATIENT
Start: 2018-11-02 | End: 2018-11-07

## 2018-11-02 RX ORDER — MUPIROCIN 20 MG/G
OINTMENT TOPICAL
COMMUNITY
Start: 2018-09-28 | End: 2019-10-29 | Stop reason: SDUPTHER

## 2018-11-02 RX ORDER — CELECOXIB 200 MG/1
200 CAPSULE ORAL
COMMUNITY
Start: 2017-12-06 | End: 2018-12-03

## 2018-11-02 RX ORDER — TRAZODONE HYDROCHLORIDE 50 MG/1
50 TABLET ORAL NIGHTLY PRN
Qty: 30 TABLET | Refills: 3 | Status: SHIPPED | OUTPATIENT
Start: 2018-11-02 | End: 2019-06-17 | Stop reason: SDUPTHER

## 2018-11-02 RX ORDER — LIDOCAINE 50 MG/G
1 PATCH TOPICAL
COMMUNITY
Start: 2017-11-15 | End: 2020-01-23

## 2018-11-02 NOTE — PATIENT INSTRUCTIONS
Trazodone tablets  What is this medicine?  TRAZODONE (TRAZ [place]) is used to treat depression.  How should I use this medicine?  Take this medicine by mouth with a glass of water. Follow the directions on the prescription label. Take this medicine shortly after a meal or a light snack. Take your medicine at regular intervals. Do not take your medicine more often than directed. Do not stop taking this medicine suddenly except upon the advice of your doctor. Stopping this medicine too quickly may cause serious side effects or your condition may worsen.  A special MedGuide will be given to you by the pharmacist with each prescription and refill. Be sure to read this information carefully each time.  Talk to your pediatrician regarding the use of this medicine in children. Special care may be needed.  What side effects may I notice from receiving this medicine?  Side effects that you should report to your doctor or health care professional as soon as possible:  · allergic reactions like skin rash, itching or hives, swelling of the face, lips, or tongue  · fast, irregular heartbeat  · feeling faint or lightheaded, falls  · painful erections or other sexual dysfunction  · suicidal thoughts or other mood changes  · trembling  Side effects that usually do not require medical attention (report to your doctor or health care professional if they continue or are bothersome):  · constipation  · headache  · muscle aches or pains  · nausea, vomiting  · unusually weak or tired  What may interact with this medicine?  Do not take this medicine with any of the following medications:  · certain medicines for fungal infections like fluconazole, itraconazole, ketoconazole, posaconazole, voriconazole  · cisapride  · dofetilide  · dronedarone  · linezolid  · MAOIs like Carbex, Eldepryl, Marplan, Nardil, and Parnate  · mesoridazine  · methylene blue (injected into a vein)  · pimozide  · saquinavir  · thioridazine  · ziprasidone  This  medicine may also interact with the following medications:  · alcohol  · antiviral medicines for HIV or AIDS  · aspirin and aspirin-like medicines  · barbiturates like phenobarbital  · certain medicines for blood pressure, heart disease, irregular heart beat  · certain medicines for depression, anxiety, or psychotic disturbances  · certain medicines for migraine headache like almotriptan, eletriptan, frovatriptan, naratriptan, rizatriptan, sumatriptan, zolmitriptan  · certain medicines for seizures like carbamazepine and phenytoin  · certain medicines for sleep  · certain medicines that treat or prevent blood clots like dalteparin, enoxaparin, warfarin  · digoxin  · fentanyl  · lithium  · NSAIDS, medicines for pain and inflammation, like ibuprofen or naproxen  · other medicines that prolong the QT interval (cause an abnormal heart rhythm)  · rasagiline  · supplements like Heber's wort, kava kava, valerian  · tramadol  · tryptophan  What if I miss a dose?  If you miss a dose, take it as soon as you can. If it is almost time for your next dose, take only that dose. Do not take double or extra doses.  Where should I keep my medicine?  Keep out of the reach of children.  Store at room temperature between 15 and 30 degrees C (59 to 86 degrees F). Protect from light. Keep container tightly closed. Throw away any unused medicine after the expiration date.  What should I tell my health care provider before I take this medicine?  They need to know if you have any of these conditions:  · attempted suicide or thinking about it  · bipolar disorder  · bleeding problems  · glaucoma  · heart disease, or previous heart attack  · irregular heart beat  · kidney or liver disease  · low levels of sodium in the blood  · an unusual or allergic reaction to trazodone, other medicines, foods, dyes or preservatives  · pregnant or trying to get pregnant  · breast-feeding  What should I watch for while using this medicine?  Tell your doctor  if your symptoms do not get better or if they get worse. Visit your doctor or health care professional for regular checks on your progress. Because it may take several weeks to see the full effects of this medicine, it is important to continue your treatment as prescribed by your doctor.  Patients and their families should watch out for new or worsening thoughts of suicide or depression. Also watch out for sudden changes in feelings such as feeling anxious, agitated, panicky, irritable, hostile, aggressive, impulsive, severely restless, overly excited and hyperactive, or not being able to sleep. If this happens, especially at the beginning of treatment or after a change in dose, call your health care professional.  You may get drowsy or dizzy. Do not drive, use machinery, or do anything that needs mental alertness until you know how this medicine affects you. Do not stand or sit up quickly, especially if you are an older patient. This reduces the risk of dizzy or fainting spells. Alcohol may interfere with the effect of this medicine. Avoid alcoholic drinks.  This medicine may cause dry eyes and blurred vision. If you wear contact lenses you may feel some discomfort. Lubricating drops may help. See your eye doctor if the problem does not go away or is severe.  Your mouth may get dry. Chewing sugarless gum, sucking hard candy and drinking plenty of water may help. Contact your doctor if the problem does not go away or is severe.  NOTE:This sheet is a summary. It may not cover all possible information. If you have questions about this medicine, talk to your doctor, pharmacist, or health care provider. Copyright© 2017 [employer]        Sinusitis (Antibiotic Treatment)    The sinuses are air-filled spaces within the bones of the face. They connect to the inside of the nose. Sinusitis is an inflammation of the tissue lining the sinus cavity. Sinus inflammation can occur during a cold. It can also be due to allergies  to pollens and other particles in the air. Sinusitis can cause symptoms of sinus congestion and fullness. A sinus infection causes fever, headache and facial pain. There is often green or yellow drainage from the nose or into the back of the throat (post-nasal drip). You have been given antibiotics to treat this condition.  Home care:  · Take the full course of antibiotics as instructed. Do not stop taking them, even if you feel better.  · Drink plenty of water, hot tea, and other liquids. This may help thin mucus. It also may promote sinus drainage.  · Heat may help soothe painful areas of the face. Use a towel soaked in hot water. Or,  the shower and direct the hot spray onto your face. Using a vaporizer along with a menthol rub at night may also help.   · An expectorant containing guaifenesin may help thin the mucus and promote drainage from the sinuses.  · Over-the-counter decongestants may be used unless a similar medicine was prescribed. Nasal sprays work the fastest. Use one that contains phenylephrine or oxymetazoline. First blow the nose gently. Then use the spray. Do not use these medicines more often than directed on the label or symptoms may get worse. You may also use tablets containing pseudoephedrine. Avoid products that combine ingredients, because side effects may be increased. Read labels. You can also ask the pharmacist for help. (NOTE: Persons with high blood pressure should not use decongestants. They can raise blood pressure.)  · Over-the-counter antihistamines may help if allergies contributed to your sinusitis.    · Do not use nasal rinses or irrigation during an acute sinus infection, unless told to by your health care provider. Rinsing may spread the infection to other sinuses.  · Use acetaminophen or ibuprofen to control pain, unless another pain medicine was prescribed. (If you have chronic liver or kidney disease or ever had a stomach ulcer, talk with your doctor before using  these medicines. Aspirin should never be used in anyone under 18 years of age who is ill with a fever. It may cause severe liver damage.)  · Don't smoke. This can worsen symptoms.  Follow-up care  Follow up with your healthcare provider or our staff if you are not improving within the next week.  When to seek medical advice  Call your healthcare provider if any of these occur:  · Facial pain or headache becoming more severe  · Stiff neck  · Unusual drowsiness or confusion  · Swelling of the forehead or eyelids  · Vision problems, including blurred or double vision  · Fever of 100.4ºF (38ºC) or higher, or as directed by your healthcare provider  · Seizure  · Breathing problems  · Symptoms not resolving within 10 days  Date Last Reviewed: 4/13/2015  © 4472-9855 [employer]. [address] [place] [address], [place] [address]. All rights reserved. This information is not intended as a substitute for professional medical care. Always follow your healthcare professional's instructions.

## 2018-11-05 NOTE — PROGRESS NOTES
Subjective:       Patient ID: Terri Herbert is a 58 y.o. female.    Chief Complaint: Establish Care    58 y.o. White female     Patient presents with:  Establish Care    HPI: Here today to establish care.  Depression--she feels bupropion is not effective. She is having difficulty sleeping as well. She has had a lot of losses (loved ones). She has not seen a psychiatrist or counselor.   HTN--uncontrolled. She is compliant with HCTZ and losartan.                     CHOL                     202 (H)             02/06/2018                 HDL                      59                  02/06/2018                 LDLCALC                  123.4               02/06/2018                 TRIG                     98                  02/06/2018            Arthritis--multiple joints. She takes NSAID's even though she has a history of gastric ulcers. She states non NSAID's are not effective.   She has been having nasal congestion, sinus pressure and pain for more than a week. Symptoms are getting worse. She denies having a fever.          Past Medical History:  Glaucoma      Comment:  rod 2017 1981: H/O tubal ligation  2001,1996: History of arthroscopy of both knees  1978: History of tonsillectomy  Hypertension    Past Surgical History:  12/28/2017: COLONOSCOPY; N/A      Comment:  Procedure: COLONOSCOPY;  Surgeon: Galen Gonzalez III, MD;  Location: [facility];  Service: Endoscopy;                 Laterality: N/A;  12/28/2017: COLONOSCOPY; N/A      Comment:  Performed by Galen Gonzalez III, MD at [facility] ENDO  12/6/2017: ESOPHAGOGASTRODUODENOSCOPY (EGD); N/A      Comment:  Performed by Galen Gonzalez III, MD at [facility] ENDO  10/11/2017: ESOPHAGOGASTRODUODENOSCOPY (EGD); N/A      Comment:  Performed by Galen Gonzalez III, MD at [facility] ENDO  01/2017: EYE SURGERY; Bilateral      Comment:  Lens replacement  1996 & 2004: knees; Bilateral  TONSILLECTOMY  1981: TUBAL LIGATION    Review of patient's family history  indicates:  Problem: Cancer      Relation: Mother          Age of Onset: (Not Specified)          Comment: colon cancer  Problem: Diabetes      Relation: Mother          Age of Onset: (Not Specified)  Problem: COPD      Relation: Father          Age of Onset: (Not Specified)  Problem: Cirrhosis      Relation: Father          Age of Onset: (Not Specified)  Problem: Alcohol abuse      Relation: Father          Age of Onset: (Not Specified)  Problem: Diabetes      Relation: Sister          Age of Onset: (Not Specified)  Problem: Heart disease      Relation: Sister          Age of Onset: (Not Specified)  Problem: Hiatal hernia      Relation: Brother          Age of Onset: (Not Specified)    Current Outpatient Medications on File Prior to Visit:  augmented betamethasone dipropionate (DIPROLENE-AF) 0.05 % ointment, , Disp: , Rfl:   buPROPion (WELLBUTRIN XL) 300 MG 24 hr tablet, Take 1 tablet (300 mg total) by mouth once daily., Disp: 30 tablet, Rfl: 6  celecoxib (CELEBREX) 200 MG capsule, Take 200 mg by mouth., Disp: , Rfl:   lidocaine (LIDODERM) 5 %, Apply 1 patch topically., Disp: , Rfl:   losartan (COZAAR) 100 MG tablet, Take 100 mg by mouth., Disp: , Rfl:   meloxicam (MOBIC) 7.5 MG tablet, Take 1 tablet (7.5 mg total) by mouth once daily., Disp: 30 tablet, Rfl: 0  mupirocin (BACTROBAN) 2 % ointment, , Disp: , Rfl:   traMADol (ULTRAM) 50 mg tablet, Take 50 mg by mouth every 8 (eight) hours as needed., Disp: , Rfl:   triamcinolone acetonide 0.1% (KENALOG) 0.1 % cream, , Disp: , Rfl:   clindamycin (CLEOCIN) 300 MG capsule, Take 1 capsule (300 mg total) by mouth every 8 (eight) hours., Disp: 30 capsule, Rfl: 0  diclofenac sodium 1 % Gel, Apply 2 g topically 4 (four) times daily., Disp: 1 Tube, Rfl: 3  gabapentin (NEURONTIN) 300 MG capsule, Take 300 mg by mouth 3 (three) times daily., Disp: , Rfl:   hydrochlorothiazide (MICROZIDE) 12.5 mg capsule, Take 12.5 mg by mouth once daily., Disp: , Rfl:   hydrOXYzine (ATARAX) 50 MG  tablet, Take 0.5 tablets (25 mg total) by mouth 3 (three) times daily as needed for Itching., Disp: 45 tablet, Rfl: 0  oxybutynin (DITROPAN-XL) 5 MG TR24, Take 1 tablet (5 mg total) by mouth 3 (three) times daily., Disp: 90 tablet, Rfl: 2  pantoprazole (PROTONIX) 40 MG tablet, Take 1 tablet (40 mg total) by mouth once daily., Disp: 30 tablet, Rfl: 11    Allergies:   Review of patient's allergies indicates:  No Known Allergies              Review of Systems   Constitutional: Negative for fever.   HENT: Positive for congestion, sinus pressure and sinus pain.    Respiratory: Negative for shortness of breath.    Cardiovascular: Negative for chest pain.   Gastrointestinal: Negative for abdominal pain.   Genitourinary: Negative for difficulty urinating.   Musculoskeletal: Positive for arthralgias and gait problem.   Neurological: Negative for dizziness and headaches.   Psychiatric/Behavioral: Positive for dysphoric mood and sleep disturbance.       Objective:      Physical Exam   Constitutional: She is oriented to person, place, and time. She appears well-developed and well-nourished. No distress.   Eyes: No scleral icterus.   Neck: No tracheal deviation present. No thyromegaly present.   Cardiovascular: Normal rate, regular rhythm and normal heart sounds.   Pulmonary/Chest: Effort normal and breath sounds normal. No respiratory distress. She has no wheezes. She has no rales.   Abdominal: Soft. Bowel sounds are normal.   Lymphadenopathy:     She has no cervical adenopathy.   Neurological: She is alert and oriented to person, place, and time.   Skin: Skin is warm and dry.   Psychiatric: Her speech is normal and behavior is normal. She exhibits a depressed mood.   Vitals reviewed.      Assessment:       1. Major depression, chronic    2. Insomnia, unspecified type    3. Hypertension goal BP (blood pressure) < 140/90    4. Hypercholesteremia    5. Arthritis, multiple joint involvement    6. Long term current use of  "non-steroidal anti-inflammatories (NSAID)    7. PUD (peptic ulcer disease)    8. Acute bacterial sinusitis    9. Morbid obesity with BMI of 60.0-69.9, adult        Plan:       Terri was seen today for establish care.    Diagnoses and all orders for this visit:    Major depression, chronic  -     Continue bupropion   -     Recommend counseling  -     Add trazodone    Insomnia, unspecified type  -     traZODone (DESYREL) 50 MG tablet; Take 1 tablet (50 mg total) by mouth nightly as needed for Insomnia. Discussed medication risks and benefits.     Hypertension goal BP (blood pressure) < 140/90  -     Continue HCTZ and losartan  -     Comprehensive metabolic panel; Standing  -     Lipid panel; Standing    Hypercholesteremia  -     Lipid panel; Standing    Arthritis, multiple joint involvement  -     Comprehensive metabolic panel; Standing    Long term current use of non-steroidal anti-inflammatories (NSAID)    PUD (peptic ulcer disease)  -     Recommend avoiding NSAID"s    Acute bacterial sinusitis  -     azithromycin (Z-AZAM) 250 MG tablet; Take 2 tablets by mouth on day 1; Take 1 tablet by mouth on days 2-5    Morbid obesity with BMI of 60.0-69.9, adult  -     Lifestyle modifications discussed    F/U in 4 weeks for depression, insomnia and HTN.          "

## 2018-11-29 ENCOUNTER — TELEPHONE (OUTPATIENT)
Dept: INTERNAL MEDICINE | Facility: CLINIC | Age: 58
End: 2018-11-29

## 2018-11-29 ENCOUNTER — OFFICE VISIT (OUTPATIENT)
Dept: INTERNAL MEDICINE | Facility: CLINIC | Age: 58
End: 2018-11-29
Payer: MEDICAID

## 2018-11-29 VITALS
BODY MASS INDEX: 48.82 KG/M2 | HEART RATE: 93 BPM | DIASTOLIC BLOOD PRESSURE: 80 MMHG | TEMPERATURE: 98 F | HEIGHT: 65 IN | WEIGHT: 293 LBS | SYSTOLIC BLOOD PRESSURE: 136 MMHG | OXYGEN SATURATION: 95 %

## 2018-11-29 DIAGNOSIS — R39.15 URINARY URGENCY: ICD-10-CM

## 2018-11-29 DIAGNOSIS — F32.9 MAJOR DEPRESSION, CHRONIC: Primary | Chronic | ICD-10-CM

## 2018-11-29 DIAGNOSIS — I10 HYPERTENSION GOAL BP (BLOOD PRESSURE) < 140/90: ICD-10-CM

## 2018-11-29 DIAGNOSIS — G47.00 INSOMNIA, UNSPECIFIED TYPE: ICD-10-CM

## 2018-11-29 LAB
BACTERIA #/AREA URNS AUTO: ABNORMAL /HPF
BILIRUB UR QL STRIP: NEGATIVE
CLARITY UR REFRACT.AUTO: ABNORMAL
COLOR UR AUTO: YELLOW
GLUCOSE UR QL STRIP: NEGATIVE
HGB UR QL STRIP: ABNORMAL
KETONES UR QL STRIP: NEGATIVE
LEUKOCYTE ESTERASE UR QL STRIP: ABNORMAL
MICROSCOPIC COMMENT: ABNORMAL
NITRITE UR QL STRIP: NEGATIVE
PH UR STRIP: 6 [PH] (ref 5–8)
PROT UR QL STRIP: NEGATIVE
RBC #/AREA URNS AUTO: 12 /HPF (ref 0–4)
SP GR UR STRIP: 1.01 (ref 1–1.03)
SQUAMOUS #/AREA URNS AUTO: 3 /HPF
URN SPEC COLLECT METH UR: ABNORMAL
WBC #/AREA URNS AUTO: 4 /HPF (ref 0–5)

## 2018-11-29 PROCEDURE — 99214 OFFICE O/P EST MOD 30 MIN: CPT | Mod: S$PBB,,, | Performed by: INTERNAL MEDICINE

## 2018-11-29 PROCEDURE — 81001 URINALYSIS AUTO W/SCOPE: CPT

## 2018-11-29 PROCEDURE — 99999 PR PBB SHADOW E&M-EST. PATIENT-LVL III: CPT | Mod: PBBFAC,,, | Performed by: INTERNAL MEDICINE

## 2018-11-29 PROCEDURE — 99213 OFFICE O/P EST LOW 20 MIN: CPT | Mod: PBBFAC | Performed by: INTERNAL MEDICINE

## 2018-11-29 NOTE — PROGRESS NOTES
DEJA faxed to [facility]- Dr. Samuel Erickson [phone] for pap and mammo results from sept 13th.

## 2018-11-29 NOTE — PROGRESS NOTES
Terri Herbert  58 y.o. White female    Chief Complaint   Patient presents with    Follow-up     depression, insomnia, HTN     HPI: Presents to the clinic for a 4 week follow up on depression, insomnia and HTN.   Depression--improved. She is compliant with bupropion and trazodone.   Insomnia--improved with trazodone.   HTN--stable on losartan and HCTZ.   She has trouble holding her urine. When she has to urinate she has to get to the toilet right away. She often has leakage of urine.     Past Medical History:   Diagnosis Date    Glaucoma     rod 2017    Hypertension        Current Outpatient Medications on File Prior to Visit   Medication Sig Dispense Refill    augmented betamethasone dipropionate (DIPROLENE-AF) 0.05 % ointment       buPROPion (WELLBUTRIN XL) 300 MG 24 hr tablet Take 1 tablet (300 mg total) by mouth once daily. 30 tablet 6    celecoxib (CELEBREX) 200 MG capsule Take 200 mg by mouth.      clindamycin (CLEOCIN) 300 MG capsule Take 1 capsule (300 mg total) by mouth every 8 (eight) hours. 30 capsule 0    gabapentin (NEURONTIN) 300 MG capsule Take 300 mg by mouth 3 (three) times daily.      hydrochlorothiazide (MICROZIDE) 12.5 mg capsule Take 12.5 mg by mouth once daily.      hydrOXYzine (ATARAX) 50 MG tablet Take 0.5 tablets (25 mg total) by mouth 3 (three) times daily as needed for Itching. 45 tablet 0    lidocaine (LIDODERM) 5 % Apply 1 patch topically.      losartan (COZAAR) 100 MG tablet Take 100 mg by mouth.      meloxicam (MOBIC) 7.5 MG tablet Take 1 tablet (7.5 mg total) by mouth once daily. 30 tablet 0    mupirocin (BACTROBAN) 2 % ointment       oxybutynin (DITROPAN-XL) 5 MG TR24 Take 1 tablet (5 mg total) by mouth 3 (three) times daily. 90 tablet 2    traMADol (ULTRAM) 50 mg tablet Take 50 mg by mouth every 8 (eight) hours as needed.      traZODone (DESYREL) 50 MG tablet Take 1 tablet (50 mg total) by mouth nightly as needed for Insomnia. 30 tablet 3    triamcinolone  acetonide 0.1% (KENALOG) 0.1 % cream       diclofenac sodium 1 % Gel Apply 2 g topically 4 (four) times daily. 1 Tube 3    pantoprazole (PROTONIX) 40 MG tablet Take 1 tablet (40 mg total) by mouth once daily. 30 tablet 11     No current facility-administered medications on file prior to visit.        Allergies:  Review of patient's allergies indicates:  No Known Allergies    PHYSICAL EXAM:  VITAL SIGNS: Reviewed  GENERAL: Alert and oriented, no acute distress  HEART: Regular rate   LUNGS: Respirations unlabored  PSYCH: Mood and affect appropriate     ASSESSMENT/PLAN:  Terri was seen today for follow-up.    Diagnoses and all orders for this visit:    Major depression, chronic  -     Continue bupropion and trazodone    Insomnia, unspecified type  -     Continue trazodone    Hypertension goal BP (blood pressure) < 140/90  -     Continue losartan and HCTZ    Urinary urgency  -     Urinalysis    F/U in 3 months

## 2018-11-29 NOTE — TELEPHONE ENCOUNTER
"----- Message from Briana Mathur LPN sent at 11/29/2018 11:12 AM CST -----  Pt stated, "Yes I have been on medication for bladder in the past ([place] 5mg) , but it did not work. Please give me something that helps."  "

## 2018-11-30 DIAGNOSIS — R82.90 ABNORMAL URINALYSIS: Primary | ICD-10-CM

## 2018-11-30 DIAGNOSIS — R31.29 HEMATURIA, MICROSCOPIC: ICD-10-CM

## 2018-11-30 DIAGNOSIS — K92.0 HEMATEMESIS WITHOUT NAUSEA: ICD-10-CM

## 2018-11-30 RX ORDER — MELOXICAM 7.5 MG/1
7.5 TABLET ORAL DAILY
Qty: 30 TABLET | Refills: 0 | Status: CANCELLED | OUTPATIENT
Start: 2018-11-30

## 2018-11-30 RX ORDER — LOSARTAN POTASSIUM 100 MG/1
100 TABLET ORAL DAILY
Qty: 90 TABLET | Refills: 1 | Status: CANCELLED | OUTPATIENT
Start: 2018-11-30

## 2018-11-30 RX ORDER — BUPROPION HYDROCHLORIDE 300 MG/1
300 TABLET ORAL DAILY
Qty: 90 TABLET | Refills: 1 | Status: CANCELLED | OUTPATIENT
Start: 2018-11-30

## 2018-11-30 RX ORDER — LOSARTAN POTASSIUM 100 MG/1
100 TABLET ORAL DAILY
Qty: 90 TABLET | Refills: 1 | Status: SHIPPED | OUTPATIENT
Start: 2018-11-30 | End: 2019-10-04 | Stop reason: SDUPTHER

## 2018-11-30 RX ORDER — BUPROPION HYDROCHLORIDE 300 MG/1
300 TABLET ORAL DAILY
Qty: 90 TABLET | Refills: 1 | Status: SHIPPED | OUTPATIENT
Start: 2018-11-30 | End: 2019-10-04 | Stop reason: SDUPTHER

## 2018-11-30 RX ORDER — PANTOPRAZOLE SODIUM 40 MG/1
40 TABLET, DELAYED RELEASE ORAL DAILY
Qty: 90 TABLET | Refills: 1 | Status: SHIPPED | OUTPATIENT
Start: 2018-11-30 | End: 2019-10-04 | Stop reason: SDUPTHER

## 2018-11-30 RX ORDER — PANTOPRAZOLE SODIUM 40 MG/1
40 TABLET, DELAYED RELEASE ORAL DAILY
Qty: 90 TABLET | Refills: 1 | Status: CANCELLED | OUTPATIENT
Start: 2018-11-30

## 2018-11-30 NOTE — TELEPHONE ENCOUNTER
Patient requesting a refill on meloxicam, however, celecoxib is also on the med card. Please clarify.

## 2018-12-03 ENCOUNTER — LAB VISIT (OUTPATIENT)
Dept: LAB | Facility: HOSPITAL | Age: 58
End: 2018-12-03
Attending: INTERNAL MEDICINE
Payer: MEDICAID

## 2018-12-03 DIAGNOSIS — R31.29 HEMATURIA, MICROSCOPIC: ICD-10-CM

## 2018-12-03 DIAGNOSIS — R82.90 ABNORMAL URINALYSIS: ICD-10-CM

## 2018-12-03 LAB
BACTERIA #/AREA URNS AUTO: ABNORMAL /HPF
BILIRUB UR QL STRIP: NEGATIVE
CLARITY UR REFRACT.AUTO: ABNORMAL
COLOR UR AUTO: YELLOW
GLUCOSE UR QL STRIP: NEGATIVE
HGB UR QL STRIP: NEGATIVE
KETONES UR QL STRIP: NEGATIVE
LEUKOCYTE ESTERASE UR QL STRIP: ABNORMAL
MICROSCOPIC COMMENT: ABNORMAL
NITRITE UR QL STRIP: NEGATIVE
PH UR STRIP: 5 [PH] (ref 5–8)
PROT UR QL STRIP: NEGATIVE
RBC #/AREA URNS AUTO: 13 /HPF (ref 0–4)
SP GR UR STRIP: 1.02 (ref 1–1.03)
SQUAMOUS #/AREA URNS AUTO: 1 /HPF
URN SPEC COLLECT METH UR: ABNORMAL
WBC #/AREA URNS AUTO: 11 /HPF (ref 0–5)

## 2018-12-03 PROCEDURE — 81001 URINALYSIS AUTO W/SCOPE: CPT

## 2018-12-03 PROCEDURE — 87086 URINE CULTURE/COLONY COUNT: CPT

## 2018-12-03 PROCEDURE — 88112 CYTOPATH CELL ENHANCE TECH: CPT | Mod: 26,,, | Performed by: PATHOLOGY

## 2018-12-03 PROCEDURE — 88112 CYTOPATH CELL ENHANCE TECH: CPT | Performed by: PATHOLOGY

## 2018-12-03 RX ORDER — MELOXICAM 7.5 MG/1
7.5 TABLET ORAL DAILY
Qty: 90 TABLET | Refills: 1 | Status: SHIPPED | OUTPATIENT
Start: 2018-12-03 | End: 2019-02-06 | Stop reason: SDUPTHER

## 2018-12-03 RX ORDER — MELOXICAM 7.5 MG/1
7.5 TABLET ORAL DAILY
Qty: 30 TABLET | Refills: 0 | OUTPATIENT
Start: 2018-12-03

## 2018-12-03 RX ORDER — MELOXICAM 7.5 MG/1
TABLET ORAL
Qty: 30 TABLET | Refills: 0 | Status: SHIPPED | OUTPATIENT
Start: 2018-12-03 | End: 2018-12-03 | Stop reason: SDUPTHER

## 2018-12-03 NOTE — TELEPHONE ENCOUNTER
Spoke to pt on the phone to confirm that pt needs her meloxicam refilled she is not taking the celebrex. Please refill.

## 2018-12-03 NOTE — TELEPHONE ENCOUNTER
----- Message from Luz Maria Lucero DO sent at 11/30/2018  4:11 PM CST -----  UA is abnormal. Blood seen in urine.   Schedule a repeat UA, culture and cytology.

## 2018-12-03 NOTE — TELEPHONE ENCOUNTER
Spoke with pt states she has already scheduled appt it was today 12/3/18 verbalized understanding.

## 2018-12-04 LAB — BACTERIA UR CULT: NORMAL

## 2018-12-06 ENCOUNTER — PATIENT MESSAGE (OUTPATIENT)
Dept: INTERNAL MEDICINE | Facility: CLINIC | Age: 58
End: 2018-12-06

## 2018-12-06 ENCOUNTER — TELEPHONE (OUTPATIENT)
Dept: INTERNAL MEDICINE | Facility: CLINIC | Age: 58
End: 2018-12-06

## 2018-12-06 NOTE — TELEPHONE ENCOUNTER
----- Message from Mirlande Roman sent at 12/6/2018  8:15 AM CST -----  Contact: pt  States she's calling regarding her test results. Please call pt at [phone]. Thank you

## 2018-12-07 ENCOUNTER — TELEPHONE (OUTPATIENT)
Dept: INTERNAL MEDICINE | Facility: CLINIC | Age: 58
End: 2018-12-07

## 2018-12-07 DIAGNOSIS — R31.29 HEMATURIA, MICROSCOPIC: Primary | ICD-10-CM

## 2018-12-07 DIAGNOSIS — N32.81 OVERACTIVE BLADDER: ICD-10-CM

## 2018-12-10 ENCOUNTER — TELEPHONE (OUTPATIENT)
Dept: INTERNAL MEDICINE | Facility: CLINIC | Age: 58
End: 2018-12-10

## 2018-12-10 DIAGNOSIS — N32.81 OVERACTIVE BLADDER: Primary | ICD-10-CM

## 2018-12-10 DIAGNOSIS — R31.29 MICROSCOPIC HEMATURIA: ICD-10-CM

## 2018-12-10 NOTE — TELEPHONE ENCOUNTER
----- Message from Luz Maria Lucero DO sent at 12/7/2018  4:56 PM CST -----  Schedule an appointment with urology for overactive bladder and hematuria.

## 2018-12-10 NOTE — TELEPHONE ENCOUNTER
----- Message from Joanne Kim MA sent at 12/10/2018  2:08 PM CST -----  Spoke with someone in Urology and they state Dr Gatica will refer them to [facility] to see someone because they don't have any available appt. She will need a referral to go to [facility].

## 2018-12-19 ENCOUNTER — PATIENT OUTREACH (OUTPATIENT)
Dept: ADMINISTRATIVE | Facility: HOSPITAL | Age: 58
End: 2018-12-19

## 2018-12-26 RX ORDER — MELOXICAM 7.5 MG/1
TABLET ORAL
Qty: 30 TABLET | Refills: 0 | OUTPATIENT
Start: 2018-12-26

## 2018-12-26 NOTE — TELEPHONE ENCOUNTER
Spoke with pt about Mobic states she didn't get 90 day supply, states she will call pharmacy to see what is going on.

## 2018-12-28 RX ORDER — MELOXICAM 7.5 MG/1
TABLET ORAL
Qty: 30 TABLET | Refills: 0 | Status: SHIPPED | OUTPATIENT
Start: 2018-12-28 | End: 2019-02-06 | Stop reason: SDUPTHER

## 2019-01-04 ENCOUNTER — TELEPHONE (OUTPATIENT)
Dept: INTERNAL MEDICINE | Facility: CLINIC | Age: 59
End: 2019-01-04

## 2019-01-04 NOTE — TELEPHONE ENCOUNTER
Spoke with pt , she is requesting her Urology referral be faxed to Dr Mahesh Stewart @ [phone] , per pt  [facility] is not taking anymore new patients. Referral faxed with confirmation

## 2019-01-04 NOTE — TELEPHONE ENCOUNTER
----- Message from Adelia Landry sent at 1/4/2019 11:27 AM CST -----  Contact: Pt  Pt would like nurse to contact her for information regarding referral. Please contact pt at ([phone])

## 2019-02-05 ENCOUNTER — TELEPHONE (OUTPATIENT)
Dept: INTERNAL MEDICINE | Facility: CLINIC | Age: 59
End: 2019-02-05

## 2019-02-05 NOTE — TELEPHONE ENCOUNTER
----- Message from Megha Putnam sent at 2/5/2019  9:04 AM CST -----  Contact: Pt  She is calling requesting to be fit into the schedule for tomorrow 02/06/19 after the pt completes her blood work due to having congestion and a bad cough,please advise [phone] (home)

## 2019-02-06 ENCOUNTER — LAB VISIT (OUTPATIENT)
Dept: LAB | Facility: HOSPITAL | Age: 59
End: 2019-02-06
Payer: MEDICAID

## 2019-02-06 ENCOUNTER — OFFICE VISIT (OUTPATIENT)
Dept: INTERNAL MEDICINE | Facility: CLINIC | Age: 59
End: 2019-02-06
Payer: MEDICAID

## 2019-02-06 VITALS
HEIGHT: 65 IN | OXYGEN SATURATION: 97 % | WEIGHT: 293 LBS | HEART RATE: 75 BPM | TEMPERATURE: 98 F | BODY MASS INDEX: 48.82 KG/M2 | SYSTOLIC BLOOD PRESSURE: 132 MMHG | DIASTOLIC BLOOD PRESSURE: 80 MMHG

## 2019-02-06 DIAGNOSIS — J01.90 ACUTE BACTERIAL SINUSITIS: Primary | ICD-10-CM

## 2019-02-06 DIAGNOSIS — B96.89 ACUTE BACTERIAL SINUSITIS: Primary | ICD-10-CM

## 2019-02-06 DIAGNOSIS — E78.00 HYPERCHOLESTEREMIA: ICD-10-CM

## 2019-02-06 DIAGNOSIS — M12.9 ARTHRITIS, MULTIPLE JOINT INVOLVEMENT: ICD-10-CM

## 2019-02-06 DIAGNOSIS — I10 HYPERTENSION GOAL BP (BLOOD PRESSURE) < 140/90: ICD-10-CM

## 2019-02-06 PROBLEM — E78.9 BORDERLINE HIGH CHOLESTEROL: Status: ACTIVE | Noted: 2017-10-06

## 2019-02-06 LAB
ALBUMIN SERPL BCP-MCNC: 3.6 G/DL
ALP SERPL-CCNC: 88 U/L
ALT SERPL W/O P-5'-P-CCNC: 12 U/L
ANION GAP SERPL CALC-SCNC: 8 MMOL/L
AST SERPL-CCNC: 14 U/L
BILIRUB SERPL-MCNC: 0.4 MG/DL
BUN SERPL-MCNC: 19 MG/DL
CALCIUM SERPL-MCNC: 9.4 MG/DL
CHLORIDE SERPL-SCNC: 104 MMOL/L
CHOLEST SERPL-MCNC: 211 MG/DL
CHOLEST/HDLC SERPL: 4 {RATIO}
CO2 SERPL-SCNC: 29 MMOL/L
CREAT SERPL-MCNC: 0.8 MG/DL
EST. GFR  (AFRICAN AMERICAN): >60 ML/MIN/1.73 M^2
EST. GFR  (NON AFRICAN AMERICAN): >60 ML/MIN/1.73 M^2
GLUCOSE SERPL-MCNC: 96 MG/DL
HDLC SERPL-MCNC: 53 MG/DL
HDLC SERPL: 25.1 %
LDLC SERPL CALC-MCNC: 125 MG/DL
NONHDLC SERPL-MCNC: 158 MG/DL
POTASSIUM SERPL-SCNC: 4 MMOL/L
PROT SERPL-MCNC: 7.2 G/DL
SODIUM SERPL-SCNC: 141 MMOL/L
TRIGL SERPL-MCNC: 165 MG/DL

## 2019-02-06 PROCEDURE — 80061 LIPID PANEL: CPT

## 2019-02-06 PROCEDURE — 99999 PR PBB SHADOW E&M-EST. PATIENT-LVL III: CPT | Mod: PBBFAC,,, | Performed by: INTERNAL MEDICINE

## 2019-02-06 PROCEDURE — 99213 OFFICE O/P EST LOW 20 MIN: CPT | Mod: S$PBB,,, | Performed by: INTERNAL MEDICINE

## 2019-02-06 PROCEDURE — 80053 COMPREHEN METABOLIC PANEL: CPT

## 2019-02-06 PROCEDURE — 36415 COLL VENOUS BLD VENIPUNCTURE: CPT

## 2019-02-06 PROCEDURE — 99213 OFFICE O/P EST LOW 20 MIN: CPT | Mod: PBBFAC | Performed by: INTERNAL MEDICINE

## 2019-02-06 PROCEDURE — 99213 PR OFFICE/OUTPT VISIT, EST, LEVL III, 20-29 MIN: ICD-10-PCS | Mod: S$PBB,,, | Performed by: INTERNAL MEDICINE

## 2019-02-06 PROCEDURE — 99999 PR PBB SHADOW E&M-EST. PATIENT-LVL III: ICD-10-PCS | Mod: PBBFAC,,, | Performed by: INTERNAL MEDICINE

## 2019-02-06 RX ORDER — AMOXICILLIN AND CLAVULANATE POTASSIUM 875; 125 MG/1; MG/1
1 TABLET, FILM COATED ORAL 2 TIMES DAILY
Qty: 20 TABLET | Refills: 0 | Status: SHIPPED | OUTPATIENT
Start: 2019-02-06 | End: 2019-02-16

## 2019-02-06 NOTE — PATIENT INSTRUCTIONS
Sinusitis (Antibiotic Treatment)    The sinuses are air-filled spaces within the bones of the face. They connect to the inside of the nose. Sinusitis is an inflammation of the tissue lining the sinus cavity. Sinus inflammation can occur during a cold. It can also be due to allergies to pollens and other particles in the air. Sinusitis can cause symptoms of sinus congestion and fullness. A sinus infection causes fever, headache and facial pain. There is often green or yellow drainage from the nose or into the back of the throat (post-nasal drip). You have been given antibiotics to treat this condition.  Home care:  · Take the full course of antibiotics as instructed. Do not stop taking them, even if you feel better.  · Drink plenty of water, hot tea, and other liquids. This may help thin mucus. It also may promote sinus drainage.  · Heat may help soothe painful areas of the face. Use a towel soaked in hot water. Or,  the shower and direct the hot spray onto your face. Using a vaporizer along with a menthol rub at night may also help.   · An expectorant containing guaifenesin may help thin the mucus and promote drainage from the sinuses.  · Over-the-counter decongestants may be used unless a similar medicine was prescribed. Nasal sprays work the fastest. Use one that contains phenylephrine or oxymetazoline. First blow the nose gently. Then use the spray. Do not use these medicines more often than directed on the label or symptoms may get worse. You may also use tablets containing pseudoephedrine. Avoid products that combine ingredients, because side effects may be increased. Read labels. You can also ask the pharmacist for help. (NOTE: Persons with high blood pressure should not use decongestants. They can raise blood pressure.)  · Over-the-counter antihistamines may help if allergies contributed to your sinusitis.    · Do not use nasal rinses or irrigation during an acute sinus infection, unless told to by  your health care provider. Rinsing may spread the infection to other sinuses.  · Use acetaminophen or ibuprofen to control pain, unless another pain medicine was prescribed. (If you have chronic liver or kidney disease or ever had a stomach ulcer, talk with your doctor before using these medicines. Aspirin should never be used in anyone under 18 years of age who is ill with a fever. It may cause severe liver damage.)  · Don't smoke. This can worsen symptoms.  Follow-up care  Follow up with your healthcare provider or our staff if you are not improving within the next week.  When to seek medical advice  Call your healthcare provider if any of these occur:  · Facial pain or headache becoming more severe  · Stiff neck  · Unusual drowsiness or confusion  · Swelling of the forehead or eyelids  · Vision problems, including blurred or double vision  · Fever of 100.4ºF (38ºC) or higher, or as directed by your healthcare provider  · Seizure  · Breathing problems  · Symptoms not resolving within 10 days  Date Last Reviewed: 4/13/2015  © 2294-2963 [employer] [address] [place] [address], [place] [address]. All rights reserved. This information is not intended as a substitute for professional medical care. Always follow your healthcare professional's instructions.

## 2019-02-06 NOTE — PROGRESS NOTES
Terri Herbert  58 y.o. White female     Chief Complaint   Patient presents with    Sinus Problem        HPI: Presents to the clinic with complaint of sinus pain and congestion. She started having symptoms 9 days ago. She was improving and started getting sick again. She denies having a fever. She reports post nasal drainage, sore throat and a cough productive of a bloody sputum. She has taken Dayquil and Nyquil without relief.     PMH: Reviewed    MEDS: Reviewed med card    ALLERGIES: Reviewed allergy card    PE: Reviewed vitals  GENERAL: Alert and oriented, no acute distress  ENT: No tonsillar exudate, mild pharyngeal erythema  NECK: No lymphadenopathy  HEART: Regular rate and rhythm  LUNGS: Unlabored respirations, bilaterally clear to auscultation     ASSESSMENT/PLAN:  Terri was seen today for sinus problem.    Diagnoses and all orders for this visit:    Acute bacterial sinusitis  -     amoxicillin-clavulanate 875-125mg (AUGMENTIN) 875-125 mg per tablet; Take 1 tablet by mouth 2 (two) times daily. for 10 days  -     Recommend Mucinex  -     Recommend increased fluids and rest  -     Educational information provided    RTC: If symptoms worsen or fail to resolve

## 2019-02-13 ENCOUNTER — OFFICE VISIT (OUTPATIENT)
Dept: INTERNAL MEDICINE | Facility: CLINIC | Age: 59
End: 2019-02-13
Payer: MEDICAID

## 2019-02-13 VITALS
BODY MASS INDEX: 48.82 KG/M2 | DIASTOLIC BLOOD PRESSURE: 74 MMHG | HEIGHT: 65 IN | SYSTOLIC BLOOD PRESSURE: 130 MMHG | WEIGHT: 293 LBS | OXYGEN SATURATION: 95 % | TEMPERATURE: 97 F | HEART RATE: 63 BPM

## 2019-02-13 DIAGNOSIS — M25.50 ARTHRALGIA OF MULTIPLE JOINTS: ICD-10-CM

## 2019-02-13 DIAGNOSIS — E78.9 BORDERLINE HIGH CHOLESTEROL: ICD-10-CM

## 2019-02-13 DIAGNOSIS — I10 HYPERTENSION GOAL BP (BLOOD PRESSURE) < 140/90: Primary | ICD-10-CM

## 2019-02-13 DIAGNOSIS — F32.9 MAJOR DEPRESSION, CHRONIC: Chronic | ICD-10-CM

## 2019-02-13 DIAGNOSIS — E66.01 MORBID OBESITY WITH BMI OF 60.0-69.9, ADULT: ICD-10-CM

## 2019-02-13 PROCEDURE — 99999 PR PBB SHADOW E&M-EST. PATIENT-LVL III: CPT | Mod: PBBFAC,,, | Performed by: INTERNAL MEDICINE

## 2019-02-13 PROCEDURE — 99214 PR OFFICE/OUTPT VISIT, EST, LEVL IV, 30-39 MIN: ICD-10-PCS | Mod: S$PBB,,, | Performed by: INTERNAL MEDICINE

## 2019-02-13 PROCEDURE — 99213 OFFICE O/P EST LOW 20 MIN: CPT | Mod: PBBFAC | Performed by: INTERNAL MEDICINE

## 2019-02-13 PROCEDURE — 99999 PR PBB SHADOW E&M-EST. PATIENT-LVL III: ICD-10-PCS | Mod: PBBFAC,,, | Performed by: INTERNAL MEDICINE

## 2019-02-13 PROCEDURE — 99214 OFFICE O/P EST MOD 30 MIN: CPT | Mod: S$PBB,,, | Performed by: INTERNAL MEDICINE

## 2019-02-13 RX ORDER — MELOXICAM 7.5 MG/1
7.5 TABLET ORAL DAILY
Qty: 90 TABLET | Refills: 1 | Status: SHIPPED | OUTPATIENT
Start: 2019-02-13 | End: 2019-08-10 | Stop reason: SDUPTHER

## 2019-02-13 NOTE — PROGRESS NOTES
Terri Herbert  58 y.o. White female    Chief Complaint   Patient presents with    Follow-up     HPI: Here today to follow up on chronic conditions.  HTN--stable on HCTZ and losartan.   Cholesterol 211  HDL 53    Triglycerides 165  Depression--stable on bupropion.   Arthralgias--primarily in her hands and knees. She states meloxicam helps. Her kidney function is within normal range.     Past Medical History:   Diagnosis Date    Glaucoma     rod 2017    Hypertension        Current Outpatient Medications on File Prior to Visit   Medication Sig Dispense Refill    amoxicillin-clavulanate 875-125mg (AUGMENTIN) 875-125 mg per tablet Take 1 tablet by mouth 2 (two) times daily. for 10 days 20 tablet 0    augmented betamethasone dipropionate (DIPROLENE-AF) 0.05 % ointment       buPROPion (WELLBUTRIN XL) 300 MG 24 hr tablet Take 1 tablet (300 mg total) by mouth once daily. 90 tablet 1    gabapentin (NEURONTIN) 300 MG capsule Take 300 mg by mouth 3 (three) times daily.      hydrochlorothiazide (MICROZIDE) 12.5 mg capsule Take 12.5 mg by mouth once daily.      hydrOXYzine (ATARAX) 50 MG tablet Take 0.5 tablets (25 mg total) by mouth 3 (three) times daily as needed for Itching. 45 tablet 0    lidocaine (LIDODERM) 5 % Apply 1 patch topically.      losartan (COZAAR) 100 MG tablet Take 1 tablet (100 mg total) by mouth once daily. 90 tablet 1    mupirocin (BACTROBAN) 2 % ointment       oxybutynin (DITROPAN-XL) 5 MG TR24 Take 1 tablet (5 mg total) by mouth 3 (three) times daily. 90 tablet 2    pantoprazole (PROTONIX) 40 MG tablet Take 1 tablet (40 mg total) by mouth once daily. 90 tablet 1    traMADol (ULTRAM) 50 mg tablet Take 50 mg by mouth every 8 (eight) hours as needed.      traZODone (DESYREL) 50 MG tablet Take 1 tablet (50 mg total) by mouth nightly as needed for Insomnia. 30 tablet 3    triamcinolone acetonide 0.1% (KENALOG) 0.1 % cream       meloxicam (MOBIC) 7.5 MG tablet Take 1 tablet (7.5 mg  total) by mouth once daily. 30 tablet 0    diclofenac sodium 1 % Gel Apply 2 g topically 4 (four) times daily. 1 Tube 3     Allergies:  Review of patient's allergies indicates:  No Known Allergies    PHYSICAL EXAM:  VITAL SIGNS: Reviewed  GENERAL: Alert and oriented, no acute distress  HEART: Normal S1 and S2, regular rate and rhythm  LUNGS: Bilaterally clear to auscultation, respirations unlabored    ASSESSMENT/PLAN:  Terri was seen today for follow-up.    Diagnoses and all orders for this visit:    Hypertension goal BP (blood pressure) < 140/90  -     Continue HCTZ and losartan    Borderline high cholesterol  -     Lifestyle modifications discussed    Major depression, chronic  -     Continue bupropion    Arthralgia of multiple joints  -     meloxicam (MOBIC) 7.5 MG tablet; Take 1 tablet (7.5 mg total) by mouth once daily.    Morbid obesity with BMI of 60.0-69.9, adult  -     Lifestyle modifications discussed    Labs and F/U in 6 months

## 2019-03-13 ENCOUNTER — TELEPHONE (OUTPATIENT)
Dept: INTERNAL MEDICINE | Facility: CLINIC | Age: 59
End: 2019-03-13

## 2019-03-13 ENCOUNTER — OFFICE VISIT (OUTPATIENT)
Dept: INTERNAL MEDICINE | Facility: CLINIC | Age: 59
End: 2019-03-13
Payer: MEDICAID

## 2019-03-13 VITALS
HEART RATE: 89 BPM | TEMPERATURE: 98 F | DIASTOLIC BLOOD PRESSURE: 78 MMHG | SYSTOLIC BLOOD PRESSURE: 136 MMHG | HEIGHT: 64 IN | OXYGEN SATURATION: 96 % | WEIGHT: 293 LBS | BODY MASS INDEX: 50.02 KG/M2

## 2019-03-13 DIAGNOSIS — L03.111 CELLULITIS OF RIGHT AXILLA: Primary | ICD-10-CM

## 2019-03-13 DIAGNOSIS — L02.411 ABSCESS OF RIGHT AXILLA: ICD-10-CM

## 2019-03-13 PROCEDURE — 99213 OFFICE O/P EST LOW 20 MIN: CPT | Mod: PBBFAC | Performed by: INTERNAL MEDICINE

## 2019-03-13 PROCEDURE — 99213 OFFICE O/P EST LOW 20 MIN: CPT | Mod: S$PBB,,, | Performed by: INTERNAL MEDICINE

## 2019-03-13 PROCEDURE — 99999 PR PBB SHADOW E&M-EST. PATIENT-LVL III: ICD-10-PCS | Mod: PBBFAC,,, | Performed by: INTERNAL MEDICINE

## 2019-03-13 PROCEDURE — 99999 PR PBB SHADOW E&M-EST. PATIENT-LVL III: CPT | Mod: PBBFAC,,, | Performed by: INTERNAL MEDICINE

## 2019-03-13 PROCEDURE — 99213 PR OFFICE/OUTPT VISIT, EST, LEVL III, 20-29 MIN: ICD-10-PCS | Mod: S$PBB,,, | Performed by: INTERNAL MEDICINE

## 2019-03-13 RX ORDER — SULFAMETHOXAZOLE AND TRIMETHOPRIM 800; 160 MG/1; MG/1
1 TABLET ORAL 2 TIMES DAILY
Qty: 20 TABLET | Refills: 0 | Status: SHIPPED | OUTPATIENT
Start: 2019-03-13 | End: 2019-04-23 | Stop reason: SDUPTHER

## 2019-03-13 NOTE — TELEPHONE ENCOUNTER
Pt is concern she might have a staph infection was able to schedule her an appt for today to see Dr Lucero

## 2019-03-13 NOTE — TELEPHONE ENCOUNTER
----- Message from Chele Briones sent at 3/13/2019  7:50 AM CDT -----  ..Type:  Same Day Appointment Request    Caller is requesting a same day appointment.  Caller declined first available appointment listed below.    Name of Caller: pt  When is the first available appointment? april  Symptoms: red bump  Best Call Back Number: [phone]  Additional Information: pt is requesting a call from nurse to get a same day work in appt due to staph infection.

## 2019-03-13 NOTE — PROGRESS NOTES
Terri Herbert  58 y.o.  White female    Chief Complaint   Patient presents with    Skin Problem       HPI:  Presents to the clinic with complaint of redness and swelling under her right arm for the past 3-4 days. She states it started after she shaved her arm pit. She has been applying heat and expressing pus from the lesion. She denies having a fever.      PMH: Reviewed    MEDS: Reviewed med card    ALLERGIES: Reviewed allergy card    PE: Reviewed vitals  GENERAL: Alert and oriented, no acute distress  AXILLA: Single raised, erythematous, tender lesion without active drainage; no streaking noted   HEART: Regular rate  LUNGS: Unlabored respirations     ASSESSMENT/PLAN:    Terri was seen today for skin problem.    Diagnoses and all orders for this visit:    Cellulitis of right axilla  -     sulfamethoxazole-trimethoprim 800-160mg (BACTRIM DS) 800-160 mg Tab; Take 1 tablet by mouth 2 (two) times daily.    Abscess of right axilla  -     sulfamethoxazole-trimethoprim 800-160mg (BACTRIM DS) 800-160 mg Tab; Take 1 tablet by mouth 2 (two) times daily.    Recommend heat application to the area.     Handout provided.     RTC: As needed

## 2019-03-13 NOTE — PATIENT INSTRUCTIONS
Abscess (Antibiotic Treatment Only)  An abscess (sometimes called a boil) happens when bacteria get trapped under the skin and start to grow. Pus forms inside the abscess as the body responds to the bacteria. An abscess can happen with an insect bite, ingrown hair, blocked oil gland, pimple, cyst, or puncture wound.  In the early stages, your wound may be red and tender. For this stage, you may get antibiotics. If the abscess does not get better with antibiotics, it will need to be drained with a small cut.  Home care  These tips will help you care for your abscess at home:  · Soak the wound in hot water or apply hot packs (small towel soaked in hot water) to the area for 20 minutes at a time. Do this 3 to 4 times a day.  · Do not cut, squeeze, or pop the boil yourself.  · Apply antibiotic cream or ointment to the skin 3 to 4 times a day, unless something else was prescribed. Some ointments include an antibiotic plus a pain reliever.  · If your doctor prescribed antibiotics, do not stop taking them until you have finished the medicine or the doctor tells you to stop.  · You may use an over-the-counter pain medicine to control pain, unless another pain medicine was prescribed. If you have chronic liver or kidney disease or ever had a stomach ulcer or gastrointestinal bleeding, talk with your doctor before using these any of these.  Follow-up care  Follow up with your healthcare provider, or as advised. Check your wound each day for the signs of worsening infection listed below.  When to seek medical advice  Get prompt medical attention if any of these occur:  · An increase in redness or swelling  · Red streaks in the skin leading away from the abscess  · An increase in local pain or swelling  · Fever of 100.4ºF (38ºC) or higher, or as directed by your healthcare provider  · Pus or fluid coming from the abscess  · Boil returns after getting better  Date Last Reviewed: 9/1/2016  © 9973-4803 [employer] [address] [place] [address]. All rights reserved. This information is not intended as a substitute for professional medical care. Always follow your healthcare professional's instructions.

## 2019-04-23 ENCOUNTER — TELEPHONE (OUTPATIENT)
Dept: INTERNAL MEDICINE | Facility: CLINIC | Age: 59
End: 2019-04-23

## 2019-04-23 DIAGNOSIS — L03.111 CELLULITIS OF RIGHT AXILLA: ICD-10-CM

## 2019-04-23 DIAGNOSIS — L02.411 ABSCESS OF RIGHT AXILLA: ICD-10-CM

## 2019-04-23 RX ORDER — SULFAMETHOXAZOLE AND TRIMETHOPRIM 800; 160 MG/1; MG/1
1 TABLET ORAL 2 TIMES DAILY
Qty: 20 TABLET | Refills: 0 | Status: SHIPPED | OUTPATIENT
Start: 2019-04-23 | End: 2019-08-21 | Stop reason: ALTCHOICE

## 2019-04-23 NOTE — TELEPHONE ENCOUNTER
Pt is requesting the medication you prescribe last time when she had a boil, she states it started 4 days ago.

## 2019-04-23 NOTE — TELEPHONE ENCOUNTER
----- Message from Mone Garcia sent at 4/23/2019  7:51 AM CDT -----  .Type:  Needs Medical Advice    Who Called: pt  Symptoms (please be specific): boil  How long has patient had these symptoms:  4 days  Pharmacy name and phone #:  [facility] [place], [place] - [address] [address]  Phone: [phone] Fax: [phone]    Would the patient rather a call back or a response via [facility]? call  Best Call Back Number: .[phone] (home)   Additional Information: please prescribe same meds as last time

## 2019-04-23 NOTE — TELEPHONE ENCOUNTER
Prescription sent to pharmacy.   If it does not heal or returns, she will need to come in for an evaluation.

## 2019-06-17 DIAGNOSIS — G47.00 INSOMNIA, UNSPECIFIED TYPE: ICD-10-CM

## 2019-06-18 RX ORDER — TRAZODONE HYDROCHLORIDE 50 MG/1
50 TABLET ORAL NIGHTLY PRN
Qty: 30 TABLET | Refills: 3 | Status: SHIPPED | OUTPATIENT
Start: 2019-06-18

## 2019-08-10 DIAGNOSIS — M25.50 ARTHRALGIA OF MULTIPLE JOINTS: ICD-10-CM

## 2019-08-13 ENCOUNTER — LAB VISIT (OUTPATIENT)
Dept: LAB | Facility: HOSPITAL | Age: 59
End: 2019-08-13
Attending: INTERNAL MEDICINE
Payer: MEDICAID

## 2019-08-13 DIAGNOSIS — M12.9 ARTHRITIS, MULTIPLE JOINT INVOLVEMENT: ICD-10-CM

## 2019-08-13 DIAGNOSIS — E78.00 HYPERCHOLESTEREMIA: ICD-10-CM

## 2019-08-13 DIAGNOSIS — I10 HYPERTENSION GOAL BP (BLOOD PRESSURE) < 140/90: ICD-10-CM

## 2019-08-13 LAB
ALBUMIN SERPL BCP-MCNC: 3.8 G/DL (ref 3.5–5.2)
ALP SERPL-CCNC: 68 U/L (ref 55–135)
ALT SERPL W/O P-5'-P-CCNC: 26 U/L (ref 10–44)
ANION GAP SERPL CALC-SCNC: 8 MMOL/L (ref 8–16)
AST SERPL-CCNC: 20 U/L (ref 10–40)
BILIRUB SERPL-MCNC: 0.6 MG/DL (ref 0.1–1)
BUN SERPL-MCNC: 16 MG/DL (ref 6–20)
CALCIUM SERPL-MCNC: 9.9 MG/DL (ref 8.7–10.5)
CHLORIDE SERPL-SCNC: 104 MMOL/L (ref 95–110)
CHOLEST SERPL-MCNC: 173 MG/DL (ref 120–199)
CHOLEST/HDLC SERPL: 3 {RATIO} (ref 2–5)
CO2 SERPL-SCNC: 29 MMOL/L (ref 23–29)
CREAT SERPL-MCNC: 0.8 MG/DL (ref 0.5–1.4)
EST. GFR  (AFRICAN AMERICAN): >60 ML/MIN/1.73 M^2
EST. GFR  (NON AFRICAN AMERICAN): >60 ML/MIN/1.73 M^2
GLUCOSE SERPL-MCNC: 88 MG/DL (ref 70–110)
HDLC SERPL-MCNC: 58 MG/DL (ref 40–75)
HDLC SERPL: 33.5 % (ref 20–50)
LDLC SERPL CALC-MCNC: 103 MG/DL (ref 63–159)
NONHDLC SERPL-MCNC: 115 MG/DL
POTASSIUM SERPL-SCNC: 4.5 MMOL/L (ref 3.5–5.1)
PROT SERPL-MCNC: 7 G/DL (ref 6–8.4)
SODIUM SERPL-SCNC: 141 MMOL/L (ref 136–145)
TRIGL SERPL-MCNC: 60 MG/DL (ref 30–150)

## 2019-08-13 PROCEDURE — 36415 COLL VENOUS BLD VENIPUNCTURE: CPT

## 2019-08-13 PROCEDURE — 80061 LIPID PANEL: CPT

## 2019-08-13 PROCEDURE — 80053 COMPREHEN METABOLIC PANEL: CPT

## 2019-08-14 RX ORDER — MELOXICAM 7.5 MG/1
TABLET ORAL
Qty: 90 TABLET | Refills: 1 | Status: SHIPPED | OUTPATIENT
Start: 2019-08-14

## 2019-08-21 ENCOUNTER — HOSPITAL ENCOUNTER (OUTPATIENT)
Dept: RADIOLOGY | Facility: HOSPITAL | Age: 59
Discharge: HOME | End: 2019-08-21
Attending: INTERNAL MEDICINE
Payer: MEDICAID

## 2019-08-21 ENCOUNTER — CLINICAL SUPPORT (OUTPATIENT)
Dept: CARDIOLOGY | Facility: CLINIC | Age: 59
End: 2019-08-21
Payer: MEDICAID

## 2019-08-21 ENCOUNTER — OFFICE VISIT (OUTPATIENT)
Dept: INTERNAL MEDICINE | Facility: CLINIC | Age: 59
End: 2019-08-21
Payer: MEDICAID

## 2019-08-21 VITALS
OXYGEN SATURATION: 97 % | BODY MASS INDEX: 50.02 KG/M2 | SYSTOLIC BLOOD PRESSURE: 132 MMHG | DIASTOLIC BLOOD PRESSURE: 80 MMHG | TEMPERATURE: 98 F | HEIGHT: 64 IN | HEART RATE: 84 BPM | WEIGHT: 293 LBS

## 2019-08-21 DIAGNOSIS — I10 HYPERTENSION GOAL BP (BLOOD PRESSURE) < 140/90: ICD-10-CM

## 2019-08-21 DIAGNOSIS — Z01.818 PREOPERATIVE EXAMINATION: ICD-10-CM

## 2019-08-21 DIAGNOSIS — Z01.818 PREOPERATIVE EXAMINATION: Primary | ICD-10-CM

## 2019-08-21 DIAGNOSIS — E66.01 MORBID OBESITY WITH BMI OF 50.0-59.9, ADULT: ICD-10-CM

## 2019-08-21 DIAGNOSIS — M25.50 ARTHRALGIA OF MULTIPLE JOINTS: ICD-10-CM

## 2019-08-21 DIAGNOSIS — K21.9 CHRONIC GERD: ICD-10-CM

## 2019-08-21 PROCEDURE — 93010 EKG 12-LEAD: ICD-10-PCS | Mod: S$PBB,,, | Performed by: INTERNAL MEDICINE

## 2019-08-21 PROCEDURE — 71046 X-RAY EXAM CHEST 2 VIEWS: CPT | Mod: TC

## 2019-08-21 PROCEDURE — 99213 OFFICE O/P EST LOW 20 MIN: CPT | Mod: PBBFAC,25 | Performed by: INTERNAL MEDICINE

## 2019-08-21 PROCEDURE — 99999 PR PBB SHADOW E&M-EST. PATIENT-LVL III: ICD-10-PCS | Mod: PBBFAC,,, | Performed by: INTERNAL MEDICINE

## 2019-08-21 PROCEDURE — 93005 ELECTROCARDIOGRAM TRACING: CPT | Mod: PBBFAC | Performed by: INTERNAL MEDICINE

## 2019-08-21 PROCEDURE — 71046 XR CHEST PA AND LATERAL: ICD-10-PCS | Mod: 26,,, | Performed by: RADIOLOGY

## 2019-08-21 PROCEDURE — 93010 ELECTROCARDIOGRAM REPORT: CPT | Mod: S$PBB,,, | Performed by: INTERNAL MEDICINE

## 2019-08-21 PROCEDURE — 99214 OFFICE O/P EST MOD 30 MIN: CPT | Mod: S$PBB,,, | Performed by: INTERNAL MEDICINE

## 2019-08-21 PROCEDURE — 99214 PR OFFICE/OUTPT VISIT, EST, LEVL IV, 30-39 MIN: ICD-10-PCS | Mod: S$PBB,,, | Performed by: INTERNAL MEDICINE

## 2019-08-21 PROCEDURE — 71046 X-RAY EXAM CHEST 2 VIEWS: CPT | Mod: 26,,, | Performed by: RADIOLOGY

## 2019-08-21 PROCEDURE — 99999 PR PBB SHADOW E&M-EST. PATIENT-LVL III: CPT | Mod: PBBFAC,,, | Performed by: INTERNAL MEDICINE

## 2019-08-21 NOTE — PROGRESS NOTES
Subjective:       Patient ID: Terri Herbert is a 59 y.o. female.    Chief Complaint: Pre-op Exam    Terri Herbert  59 y.o. White female     Patient presents with:  Pre-op Exam    HPI: Here today for a preoperative evaluation. She is preparing to have bariatric surgery. Her surgery has not yet been scheduled.   She denies prior history of surgical or anesthetic complications.  HTN--stable on losartan and HCTZ.                    LDLCALC                  103.0               08/13/2019            GERD--stable on pantoprazole.   Joint pain--multiple joints. Stable on meloxicam as needed.     Past Medical History:  Arthritis  Depression  Glaucoma      Comment:  rod 2017  Hypertension    Past Surgical History:  12/28/2017: COLONOSCOPY; N/A      Comment:  Performed by Galen Gonzalez III, MD at [facility] ENDO  12/6/2017: ESOPHAGOGASTRODUODENOSCOPY (EGD); N/A      Comment:  Performed by Galen Gonzalez III, MD at [facility] ENDO  10/11/2017: ESOPHAGOGASTRODUODENOSCOPY (EGD); N/A      Comment:  Performed by Galen Gonzalez III, MD at [facility] ENDO  01/2017: EYE SURGERY; Bilateral      Comment:  Lens replacement  TONSILLECTOMY  1981: TUBAL LIGATION    Review of patient's family history indicates:  Problem: Cancer      Relation: Mother          Age of Onset: (Not Specified)          Comment: colon cancer  Problem: Diabetes      Relation: Mother          Age of Onset: (Not Specified)  Problem: COPD      Relation: Father          Age of Onset: (Not Specified)  Problem: Cirrhosis      Relation: Father          Age of Onset: (Not Specified)  Problem: Alcohol abuse      Relation: Father          Age of Onset: (Not Specified)  Problem: Diabetes      Relation: Sister          Age of Onset: (Not Specified)  Problem: Heart disease      Relation: Sister          Age of Onset: (Not Specified)  Problem: Hiatal hernia      Relation: Brother          Age of Onset: (Not Specified)      Current Outpatient Medications on File Prior to  Visit:  augmented betamethasone dipropionate (DIPROLENE-AF) 0.05 % ointment, , Disp: , Rfl:   buPROPion (WELLBUTRIN XL) 300 MG 24 hr tablet, Take 1 tablet (300 mg total) by mouth once daily., Disp: 90 tablet, Rfl: 1  diclofenac sodium 1 % Gel, Apply 2 g topically 4 (four) times daily., Disp: 1 Tube, Rfl: 3  gabapentin (NEURONTIN) 300 MG capsule, Take 300 mg by mouth 3 (three) times daily., Disp: , Rfl:   hydrochlorothiazide (MICROZIDE) 12.5 mg capsule, Take 12.5 mg by mouth once daily., Disp: , Rfl:   lidocaine (LIDODERM) 5 %, Apply 1 patch topically., Disp: , Rfl:   losartan (COZAAR) 100 MG tablet, Take 1 tablet (100 mg total) by mouth once daily., Disp: 90 tablet, Rfl: 1  meloxicam (MOBIC) 7.5 MG tablet, TAKE 1 TABLET BY MOUTH ONCE DAILY, Disp: 90 tablet, Rfl: 1  mupirocin (BACTROBAN) 2 % ointment, , Disp: , Rfl:   pantoprazole (PROTONIX) 40 MG tablet, Take 1 tablet (40 mg total) by mouth once daily., Disp: 90 tablet, Rfl: 1  traMADol (ULTRAM) 50 mg tablet, Take 50 mg by mouth every 8 (eight) hours as needed., Disp: , Rfl:   traZODone (DESYREL) 50 MG tablet, Take 1 tablet (50 mg total) by mouth nightly as needed for Insomnia., Disp: 30 tablet, Rfl: 3  triamcinolone acetonide 0.1% (KENALOG) 0.1 % cream, , Disp: , Rfl:   hydrOXYzine (ATARAX) 50 MG tablet, Take 0.5 tablets (25 mg total) by mouth 3 (three) times daily as needed for Itching., Disp: 45 tablet, Rfl: 0    Allergies:  Review of patient's allergies indicates:  No Known Allergies      Review of Systems   Constitutional: Negative for fever and unexpected weight change.   HENT: Negative for ear pain, sinus pain and sore throat.    Respiratory: Negative for cough and shortness of breath.    Cardiovascular: Negative for chest pain.   Gastrointestinal: Negative for abdominal pain and diarrhea.   Genitourinary: Negative for difficulty urinating and dysuria.   Musculoskeletal: Positive for arthralgias and gait problem.   Neurological: Negative for dizziness and  headaches.       Objective:      Physical Exam   Constitutional: She is oriented to person, place, and time. She appears well-developed and well-nourished. No distress.   Eyes: No scleral icterus.   Neck: No tracheal deviation present.   Cardiovascular: Normal rate, regular rhythm and normal heart sounds.   Pulmonary/Chest: Effort normal and breath sounds normal. No respiratory distress. She has no wheezes. She has no rales.   Abdominal: Soft. Bowel sounds are normal.   Neurological: She is alert and oriented to person, place, and time.   Skin: Skin is warm and dry.   Psychiatric: She has a normal mood and affect.   Vitals reviewed.      Assessment:       1. Preoperative examination    2. Hypertension goal BP (blood pressure) < 140/90    3. Chronic GERD    4. Arthralgia of multiple joints    5. Morbid obesity with BMI of 50.0-59.9, adult        Plan:       Terri was seen today for pre-op exam.    Diagnoses and all orders for this visit:    Preoperative examination  -     CBC auto differential; Future  -     SCHEDULED EKG 12-LEAD (to Muse); Future  -     X-Ray Chest PA And Lateral; Future    Hypertension goal BP (blood pressure) < 140/90  -     CBC auto differential; Future  -     SCHEDULED EKG 12-LEAD (to Muse); Future  -     X-Ray Chest PA And Lateral; Future    Chronic GERD  -     Continue pantoprazole    Arthralgia of multiple joints  -     Continue meloxicam    Morbid obesity with BMI of 50.0-59.9, adult  -     Lifestyle modifications discussed  -     Bariatric surgery planned    Labs, CXR and EKG today.     Recommendations to follow .     Addendum:  Abnormal findings seen on CXR. CT of the chest did not show any suspicious findings.   EKG showed abnormalities. She has been evaluated and cleared by cardiology.     Okay to proceed with surgery.

## 2019-08-22 ENCOUNTER — TELEPHONE (OUTPATIENT)
Dept: INTERNAL MEDICINE | Facility: CLINIC | Age: 59
End: 2019-08-22

## 2019-08-22 DIAGNOSIS — R93.89 ABNORMAL CHEST X-RAY: Primary | ICD-10-CM

## 2019-08-23 ENCOUNTER — HOSPITAL ENCOUNTER (OUTPATIENT)
Dept: RADIOLOGY | Facility: HOSPITAL | Age: 59
Discharge: HOME | End: 2019-08-23
Attending: INTERNAL MEDICINE
Payer: MEDICAID

## 2019-08-23 DIAGNOSIS — R93.89 ABNORMAL CHEST X-RAY: ICD-10-CM

## 2019-08-23 PROCEDURE — 71046 X-RAY EXAM CHEST 2 VIEWS: CPT | Mod: TC

## 2019-08-23 PROCEDURE — 71046 X-RAY EXAM CHEST 2 VIEWS: CPT | Mod: 26,,, | Performed by: RADIOLOGY

## 2019-08-23 PROCEDURE — 71046 XR CHEST PA AND LATERAL: ICD-10-PCS | Mod: 26,,, | Performed by: RADIOLOGY

## 2019-08-26 ENCOUNTER — TELEPHONE (OUTPATIENT)
Dept: INTERNAL MEDICINE | Facility: CLINIC | Age: 59
End: 2019-08-26

## 2019-08-26 DIAGNOSIS — R94.31 ABNORMAL EKG: Primary | ICD-10-CM

## 2019-08-26 DIAGNOSIS — Z01.810 PREOP CARDIOVASCULAR EXAM: ICD-10-CM

## 2019-08-27 ENCOUNTER — TELEPHONE (OUTPATIENT)
Dept: INTERNAL MEDICINE | Facility: CLINIC | Age: 59
End: 2019-08-27

## 2019-08-27 DIAGNOSIS — R93.89 ABNORMAL CHEST X-RAY: Primary | ICD-10-CM

## 2019-08-27 DIAGNOSIS — R91.1 LUNG NODULE: ICD-10-CM

## 2019-08-27 NOTE — TELEPHONE ENCOUNTER
----- Message from Charline Han sent at 8/27/2019  7:26 AM CDT -----  Contact: pt  Please call pt @ [phone] regarding referral for Cardio Doctor, pt have some questions.

## 2019-08-27 NOTE — TELEPHONE ENCOUNTER
----- Message from Uzma Gresham sent at 8/27/2019  1:42 PM CDT -----  Contact: Self  Type:  Patient Returning Call    Who Called:Terri  Who Left Message for Patient:  Does the patient know what this is regarding?:  Would the patient rather a call back or a response via [facility]? call  Best Call Back Number:[phone]  Additional Information:

## 2019-08-29 ENCOUNTER — TELEPHONE (OUTPATIENT)
Dept: INTERNAL MEDICINE | Facility: CLINIC | Age: 59
End: 2019-08-29

## 2019-08-29 NOTE — TELEPHONE ENCOUNTER
----- Message from Luz Maria Lucero DO sent at 8/27/2019  8:28 AM CDT -----  Results and recommendations sent to patient's [facility] account.  Schedule chest CT.

## 2019-09-03 ENCOUNTER — TELEPHONE (OUTPATIENT)
Dept: INTERNAL MEDICINE | Facility: CLINIC | Age: 59
End: 2019-09-03

## 2019-09-03 NOTE — TELEPHONE ENCOUNTER
Spoke to pt about getting an appt for tomorrow, informed her I don't see where Dr Lucero has any openings so I will try and get her in with another provider and give her a call when I get her scheduled.

## 2019-09-03 NOTE — TELEPHONE ENCOUNTER
----- Message from Mone Garcia sent at 9/3/2019  9:21 AM CDT -----  .Type:  Same Day Appointment Request    Caller is requesting a same day appointment.  Caller declined first available appointment listed below.    Name of Caller:self  When is the first available appointment?10/2  Symptoms:boil under right arm  Best Call Back Number:.[phone] (home)   Additional Information: prefers

## 2019-09-04 ENCOUNTER — OFFICE VISIT (OUTPATIENT)
Dept: INTERNAL MEDICINE | Facility: CLINIC | Age: 59
End: 2019-09-04
Payer: MEDICAID

## 2019-09-04 ENCOUNTER — OFFICE VISIT (OUTPATIENT)
Dept: CARDIOLOGY | Facility: CLINIC | Age: 59
End: 2019-09-04
Payer: MEDICAID

## 2019-09-04 ENCOUNTER — HOSPITAL ENCOUNTER (OUTPATIENT)
Dept: RADIOLOGY | Facility: HOSPITAL | Age: 59
Discharge: HOME | End: 2019-09-04
Attending: INTERNAL MEDICINE
Payer: MEDICAID

## 2019-09-04 VITALS
BODY MASS INDEX: 50.02 KG/M2 | OXYGEN SATURATION: 98 % | DIASTOLIC BLOOD PRESSURE: 84 MMHG | SYSTOLIC BLOOD PRESSURE: 128 MMHG | TEMPERATURE: 98 F | HEIGHT: 64 IN | HEART RATE: 65 BPM | WEIGHT: 293 LBS

## 2019-09-04 VITALS
WEIGHT: 293 LBS | SYSTOLIC BLOOD PRESSURE: 140 MMHG | HEIGHT: 64 IN | HEART RATE: 58 BPM | BODY MASS INDEX: 50.02 KG/M2 | DIASTOLIC BLOOD PRESSURE: 80 MMHG

## 2019-09-04 DIAGNOSIS — R91.1 LUNG NODULE: ICD-10-CM

## 2019-09-04 DIAGNOSIS — E78.9 BORDERLINE HIGH CHOLESTEROL: ICD-10-CM

## 2019-09-04 DIAGNOSIS — R93.89 ABNORMAL CHEST X-RAY: ICD-10-CM

## 2019-09-04 DIAGNOSIS — L02.91 ABSCESS: Primary | ICD-10-CM

## 2019-09-04 DIAGNOSIS — Z01.810 PREOP CARDIOVASCULAR EXAM: ICD-10-CM

## 2019-09-04 DIAGNOSIS — I10 HYPERTENSION GOAL BP (BLOOD PRESSURE) < 140/90: Primary | ICD-10-CM

## 2019-09-04 PROCEDURE — 99999 PR PBB SHADOW E&M-EST. PATIENT-LVL IV: CPT | Mod: PBBFAC,,, | Performed by: PHYSICIAN ASSISTANT

## 2019-09-04 PROCEDURE — 99999 PR PBB SHADOW E&M-EST. PATIENT-LVL III: CPT | Mod: PBBFAC,,, | Performed by: INTERNAL MEDICINE

## 2019-09-04 PROCEDURE — 99205 PR OFFICE/OUTPT VISIT, NEW, LEVL V, 60-74 MIN: ICD-10-PCS | Mod: S$PBB,,, | Performed by: INTERNAL MEDICINE

## 2019-09-04 PROCEDURE — 99214 PR OFFICE/OUTPT VISIT, EST, LEVL IV, 30-39 MIN: ICD-10-PCS | Mod: S$PBB,,, | Performed by: PHYSICIAN ASSISTANT

## 2019-09-04 PROCEDURE — 99214 OFFICE O/P EST MOD 30 MIN: CPT | Mod: S$PBB,,, | Performed by: PHYSICIAN ASSISTANT

## 2019-09-04 PROCEDURE — 99213 OFFICE O/P EST LOW 20 MIN: CPT | Mod: PBBFAC,25,27 | Performed by: INTERNAL MEDICINE

## 2019-09-04 PROCEDURE — 71250 CT THORAX DX C-: CPT | Mod: TC

## 2019-09-04 PROCEDURE — 99214 OFFICE O/P EST MOD 30 MIN: CPT | Mod: PBBFAC,25 | Performed by: PHYSICIAN ASSISTANT

## 2019-09-04 PROCEDURE — 99999 PR PBB SHADOW E&M-EST. PATIENT-LVL IV: ICD-10-PCS | Mod: PBBFAC,,, | Performed by: PHYSICIAN ASSISTANT

## 2019-09-04 PROCEDURE — 99999 PR PBB SHADOW E&M-EST. PATIENT-LVL III: ICD-10-PCS | Mod: PBBFAC,,, | Performed by: INTERNAL MEDICINE

## 2019-09-04 PROCEDURE — 99205 OFFICE O/P NEW HI 60 MIN: CPT | Mod: S$PBB,,, | Performed by: INTERNAL MEDICINE

## 2019-09-04 RX ORDER — SULFAMETHOXAZOLE AND TRIMETHOPRIM 800; 160 MG/1; MG/1
1 TABLET ORAL 2 TIMES DAILY
Qty: 20 TABLET | Refills: 0 | Status: SHIPPED | OUTPATIENT
Start: 2019-09-04 | End: 2019-09-14

## 2019-09-04 NOTE — PROGRESS NOTES
Subjective:       Patient ID: Terri Herbert is a 59 y.o. female.    Chief Complaint: abscess axillary (PCP - Lucero)    Abscess   Chronicity:  NewProgression Since Onset: gradually worsening  Abscess location: left axilla.  Associated Symptoms: no fever, no chills, no sweats  Characteristics: painful, redness and swelling    Treatments Tried:  Nothing  Relieved by:  Nothing  Worsened by:  Nothing    Review of Systems   Constitutional: Negative for chills and fever.   Respiratory: Negative for chest tightness and shortness of breath.    Cardiovascular: Negative for chest pain.   Gastrointestinal: Negative for abdominal pain.       Objective:      Physical Exam   Constitutional: She appears well-developed and well-nourished. No distress.   Neck: Neck supple.   Cardiovascular: Normal rate and regular rhythm. Exam reveals no gallop and no friction rub.   No murmur heard.  Pulmonary/Chest: Effort normal and breath sounds normal. No stridor. No respiratory distress. She has no wheezes. She has no rales. She exhibits no tenderness.   Lymphadenopathy:     She has no cervical adenopathy.   Skin: She is not diaphoretic.        Nursing note and vitals reviewed.      Assessment:       1. Abscess        Plan:       Abscess    Other orders  -     sulfamethoxazole-trimethoprim 800-160mg (BACTRIM DS) 800-160 mg Tab; Take 1 tablet by mouth 2 (two) times daily. for 10 days  Dispense: 20 tablet; Refill: 0

## 2019-09-04 NOTE — PROGRESS NOTES
Subjective:   Patient ID:  Terri Herbert is a 59 y.o. female who presents for follow-up of Hypertension  Pt planned for gastric sleeve at [facility]. Patient denies CP, angina or anginal equivalent.    CRF- HTN, HLP, weight  Hypertension   This is a chronic problem. The current episode started more than 1 year ago. The problem has been gradually improving since onset. The problem is controlled. Pertinent negatives include no chest pain, palpitations or shortness of breath. Past treatments include angiotensin blockers and diuretics. The current treatment provides moderate improvement. There are no compliance problems.    Hyperlipidemia   This is a chronic problem. The current episode started more than 1 year ago. The problem is controlled. Recent lipid tests were reviewed and are variable. Pertinent negatives include no chest pain or shortness of breath. Current antihyperlipidemic treatment includes diet change. The current treatment provides moderate improvement of lipids. Compliance problems include adherence to exercise.  Risk factors for coronary artery disease include dyslipidemia, hypertension, post-menopausal and obesity.       Review of Systems   Constitution: Negative. Negative for weight gain.   HENT: Negative.    Eyes: Negative.    Cardiovascular: Negative.  Negative for chest pain, leg swelling and palpitations.   Respiratory: Negative.  Negative for shortness of breath.    Endocrine: Negative.    Hematologic/Lymphatic: Negative.    Skin: Negative.    Musculoskeletal: Negative for muscle weakness.   Gastrointestinal: Negative.    Genitourinary: Negative.    Neurological: Negative.  Negative for dizziness.   Psychiatric/Behavioral: Negative.    Allergic/Immunologic: Negative.      Family History   Problem Relation Age of Onset    Cancer Mother         colon cancer    Diabetes Mother     COPD Father     Cirrhosis Father     Alcohol abuse Father     Diabetes Sister     Heart disease Sister      Hiatal hernia Brother      Past Medical History:   Diagnosis Date    Arthritis     Depression     Glaucoma     rod 2017    Hypertension      Social History     Socioeconomic History    Marital status:      Spouse name: Not on file    Number of children: Not on file    Years of education: Not on file    Highest education level: Not on file   Occupational History    Not on file   Social Needs    Financial resource strain: Not on file    Food insecurity:     Worry: Not on file     Inability: Not on file    Transportation needs:     Medical: Not on file     Non-medical: Not on file   Tobacco Use    Smoking status: Never Smoker    Smokeless tobacco: Never Used   Substance and Sexual Activity    Alcohol use: Yes     Alcohol/week: 0.6 oz     Types: 1 Glasses of wine per week     Comment: ocassion    Drug use: No    Sexual activity: Never     Partners: Male     Comment:  passed in 2014   Lifestyle    Physical activity:     Days per week: Not on file     Minutes per session: Not on file    Stress: Not on file   Relationships    Social connections:     Talks on phone: Not on file     Gets together: Not on file     Attends Bahai service: Not on file     Active member of club or organization: Not on file     Attends meetings of clubs or organizations: Not on file     Relationship status: Not on file   Other Topics Concern    Not on file   Social History Narrative    Not on file     Current Outpatient Medications on File Prior to Visit   Medication Sig Dispense Refill    augmented betamethasone dipropionate (DIPROLENE-AF) 0.05 % ointment       buPROPion (WELLBUTRIN XL) 300 MG 24 hr tablet Take 1 tablet (300 mg total) by mouth once daily. 90 tablet 1    diclofenac sodium 1 % Gel Apply 2 g topically 4 (four) times daily. 1 Tube 3    gabapentin (NEURONTIN) 300 MG capsule Take 300 mg by mouth 3 (three) times daily.      hydrochlorothiazide (MICROZIDE) 12.5 mg capsule Take 12.5 mg by  mouth once daily.      hydrOXYzine (ATARAX) 50 MG tablet Take 0.5 tablets (25 mg total) by mouth 3 (three) times daily as needed for Itching. 45 tablet 0    lidocaine (LIDODERM) 5 % Apply 1 patch topically.      losartan (COZAAR) 100 MG tablet Take 1 tablet (100 mg total) by mouth once daily. 90 tablet 1    meloxicam (MOBIC) 7.5 MG tablet TAKE 1 TABLET BY MOUTH ONCE DAILY 90 tablet 1    mupirocin (BACTROBAN) 2 % ointment       pantoprazole (PROTONIX) 40 MG tablet Take 1 tablet (40 mg total) by mouth once daily. 90 tablet 1    traMADol (ULTRAM) 50 mg tablet Take 50 mg by mouth every 8 (eight) hours as needed.      traZODone (DESYREL) 50 MG tablet Take 1 tablet (50 mg total) by mouth nightly as needed for Insomnia. 30 tablet 3    triamcinolone acetonide 0.1% (KENALOG) 0.1 % cream        No current facility-administered medications on file prior to visit.      Review of patient's allergies indicates:  No Known Allergies    Objective:     Physical Exam   Constitutional: She is oriented to person, place, and time. She appears well-developed and well-nourished.   HENT:   Head: Normocephalic and atraumatic.   Eyes: Pupils are equal, round, and reactive to light. Conjunctivae and EOM are normal.   Neck: Normal range of motion. Neck supple.   Cardiovascular: Normal rate, regular rhythm, normal heart sounds and intact distal pulses.   Pulmonary/Chest: Effort normal and breath sounds normal.   Abdominal: Soft. Bowel sounds are normal.   Musculoskeletal: Normal range of motion.   Neurological: She is alert and oriented to person, place, and time.   Skin: Skin is warm and dry.   Psychiatric: She has a normal mood and affect.   Nursing note and vitals reviewed.      Assessment:     1. Hypertension goal BP (blood pressure) < 140/90    2. Borderline high cholesterol    3. Preop cardiovascular exam        Plan:     Hypertension goal BP (blood pressure) < 140/90    Borderline high cholesterol    Preop cardiovascular  exam      Continue losartan, hctz-htn  Mediterranean diet-hlp  Pt cleared for procedure at moderate CV risk

## 2019-09-04 NOTE — LETTER
September 4, 2019      Luz Maria Lucero DO  [address] [place] [address] Dr Adrian MIRELES [address]           [facility]  [address] [place] [address] Justin MIRELES [address]  Phone: [phone]  Fax: [phone]          Patient: Terri Herbert   MR Number: [id number]   YOB: 1960   Date of Visit: 9/4/2019       Dear Dr. Luz Maria Lucero:    Thank you for referring Terri Herbert to me for evaluation. Attached you will find relevant portions of my assessment and plan of care.    If you have questions, please do not hesitate to call me. I look forward to following Terri Herbert along with you.    Sincerely,    Lambert Lizama MD    Enclosure  CC:  No Recipients    If you would like to receive this communication electronically, please contact [email or web address] or [phone] to request more information on [facility] access.    For providers and/or their staff who would like to refer a patient to [facility], please contact us through our one-stop-shop provider referral line, Teresa Stevens, at [phone].    If you feel you have received this communication in error or would no longer like to receive these types of communications, please e-mail [email or web address]

## 2019-09-06 ENCOUNTER — PATIENT MESSAGE (OUTPATIENT)
Dept: INTERNAL MEDICINE | Facility: CLINIC | Age: 59
End: 2019-09-06

## 2019-09-12 ENCOUNTER — PATIENT MESSAGE (OUTPATIENT)
Dept: INTERNAL MEDICINE | Facility: CLINIC | Age: 59
End: 2019-09-12

## 2019-09-13 RX ORDER — SULFAMETHOXAZOLE AND TRIMETHOPRIM 800; 160 MG/1; MG/1
1 TABLET ORAL 2 TIMES DAILY
Qty: 20 TABLET | Refills: 0 | Status: SHIPPED | OUTPATIENT
Start: 2019-09-13 | End: 2019-09-23

## 2019-10-04 DIAGNOSIS — K92.0 HEMATEMESIS WITHOUT NAUSEA: ICD-10-CM

## 2019-10-07 RX ORDER — PANTOPRAZOLE SODIUM 40 MG/1
TABLET, DELAYED RELEASE ORAL
Qty: 90 TABLET | Refills: 1 | Status: SHIPPED | OUTPATIENT
Start: 2019-10-07

## 2019-10-07 RX ORDER — BUPROPION HYDROCHLORIDE 300 MG/1
TABLET ORAL
Qty: 90 TABLET | Refills: 1 | Status: SHIPPED | OUTPATIENT
Start: 2019-10-07

## 2019-10-07 RX ORDER — LOSARTAN POTASSIUM 100 MG/1
TABLET ORAL
Qty: 90 TABLET | Refills: 1 | Status: SHIPPED | OUTPATIENT
Start: 2019-10-07

## 2019-10-28 ENCOUNTER — TELEPHONE (OUTPATIENT)
Dept: INTERNAL MEDICINE | Facility: CLINIC | Age: 59
End: 2019-10-28

## 2019-10-28 NOTE — TELEPHONE ENCOUNTER
Spoke with pt she is requesting a refill of the antibiotics that Dr. Lucero always gives her for the reoccurring boils under her arms. It has come back and she doesn't feel that she needs to come in just send something in for it. Please advise.

## 2019-10-28 NOTE — TELEPHONE ENCOUNTER
----- Message from Mignon Oconnell sent at 10/28/2019 10:29 AM CDT -----  Contact: pt  Patient returning call about script. Please call at [phone].

## 2019-10-28 NOTE — TELEPHONE ENCOUNTER
----- Message from Leora Yuri sent at 10/28/2019  7:42 AM CDT -----  Contact: [phone]  Would  like to consult with the nurse, Patient  Would like to get a refill on her Rx Medication, Please call back at  [phone], Thanks sj  .Type:  RX Refill Request    Who Called:  Ms Herbert  Refill or New Rx:refill  RX Name and Strength:Anticbitis,   How is the patient currently taking it? (ex. 1XDay): As Needed  Is this a 30 day or 90 day RX:30  Preferred Pharmacy with phone number:.  [facility] [place], [place] - [id number] [facility] [address]  Phone: [phone] Fax: [phone]      Local or Mail Order:Local  Ordering Provider:Dr Lucero  Would the patient rather a call back or a response via [facility]? CallBack  Best Call Back Number:[phone]  Additional Information:

## 2019-10-28 NOTE — TELEPHONE ENCOUNTER
Called pt to confirm the type./name of medication that needed to be refilled, no answer, left message to call us back.

## 2019-10-29 ENCOUNTER — OFFICE VISIT (OUTPATIENT)
Dept: INTERNAL MEDICINE | Facility: CLINIC | Age: 59
End: 2019-10-29
Payer: MEDICAID

## 2019-10-29 VITALS
WEIGHT: 293 LBS | TEMPERATURE: 98 F | DIASTOLIC BLOOD PRESSURE: 92 MMHG | BODY MASS INDEX: 50.02 KG/M2 | OXYGEN SATURATION: 98 % | HEART RATE: 60 BPM | SYSTOLIC BLOOD PRESSURE: 142 MMHG | HEIGHT: 64 IN

## 2019-10-29 DIAGNOSIS — L02.412 ABSCESS OF LEFT AXILLA: Primary | ICD-10-CM

## 2019-10-29 DIAGNOSIS — L70.0 ACNE VULGARIS: ICD-10-CM

## 2019-10-29 DIAGNOSIS — I10 HYPERTENSION GOAL BP (BLOOD PRESSURE) < 140/90: ICD-10-CM

## 2019-10-29 PROCEDURE — 99214 PR OFFICE/OUTPT VISIT, EST, LEVL IV, 30-39 MIN: ICD-10-PCS | Mod: S$PBB,,, | Performed by: NURSE PRACTITIONER

## 2019-10-29 PROCEDURE — 99214 OFFICE O/P EST MOD 30 MIN: CPT | Mod: PBBFAC | Performed by: NURSE PRACTITIONER

## 2019-10-29 PROCEDURE — 99999 PR PBB SHADOW E&M-EST. PATIENT-LVL IV: ICD-10-PCS | Mod: PBBFAC,,, | Performed by: NURSE PRACTITIONER

## 2019-10-29 PROCEDURE — 99214 OFFICE O/P EST MOD 30 MIN: CPT | Mod: S$PBB,,, | Performed by: NURSE PRACTITIONER

## 2019-10-29 PROCEDURE — 99999 PR PBB SHADOW E&M-EST. PATIENT-LVL IV: CPT | Mod: PBBFAC,,, | Performed by: NURSE PRACTITIONER

## 2019-10-29 RX ORDER — CLINDAMYCIN PHOSPHATE 10 MG/G
GEL TOPICAL 2 TIMES DAILY
Qty: 30 G | Refills: 1 | Status: SHIPPED | OUTPATIENT
Start: 2019-10-29 | End: 2019-10-29

## 2019-10-29 RX ORDER — MUPIROCIN 20 MG/G
OINTMENT TOPICAL 2 TIMES DAILY
Qty: 1 TUBE | Refills: 0 | Status: SHIPPED | OUTPATIENT
Start: 2019-10-29 | End: 2020-01-23

## 2019-10-29 RX ORDER — CLINDAMYCIN PHOSPHATE 10 MG/G
GEL TOPICAL 2 TIMES DAILY
Qty: 30 G | Refills: 1 | Status: SHIPPED | OUTPATIENT
Start: 2019-10-29 | End: 2020-01-23

## 2019-10-29 RX ORDER — SULFAMETHOXAZOLE AND TRIMETHOPRIM 800; 160 MG/1; MG/1
1 TABLET ORAL 2 TIMES DAILY
Qty: 20 TABLET | Refills: 0 | Status: SHIPPED | OUTPATIENT
Start: 2019-10-29 | End: 2019-11-06 | Stop reason: SDUPTHER

## 2019-10-29 NOTE — PROGRESS NOTES
Terri Herbert 59 y.o. female     Chief Complaint:  Chief Complaint   Patient presents with    abscess left axilla     NARESH - Nic       History of Present Illness:  Pt is new to provider, but established in practice and presents for abscess to left axilla which started Friday got worse through Sunday when it ruptured, and is since improving. Pt self expressed and doing hot compresses.      Recently seen on 9/4 for right axilla abscess and successfully treated with Bactrim.     Also requesting refill of clindagel previously prescribed by derm for acne.      BP elevated,140s/90s. Has not been taking HCTZ, was only previously on it PRN, denies HA/tingling/CP/vision changes etc.      Pending bariatric surgery     Exam:  Review of Systems   Constitutional: Negative for chills, diaphoresis and fever.   Respiratory: Negative for cough and shortness of breath.    Cardiovascular: Negative for chest pain and leg swelling.   Musculoskeletal: Negative for myalgias.   Skin: Negative for rash.        Left axillary boil   Neurological: Negative for dizziness and headaches.     Physical Exam   Constitutional: She is oriented to person, place, and time. She appears well-developed. She is cooperative.  Non-toxic appearance. She does not appear ill. No distress.   obese   HENT:   Head: Normocephalic and atraumatic.   Eyes: Pupils are equal, round, and reactive to light. Conjunctivae and EOM are normal. Right eye exhibits no discharge. Left eye exhibits no discharge. No scleral icterus.   Neck: Normal range of motion. No tracheal deviation present.   Cardiovascular: Normal rate and regular rhythm.   Pulmonary/Chest: Effort normal and breath sounds normal. No stridor. No respiratory distress. She has no wheezes. She has no rales. She exhibits no tenderness.   Abdominal: Soft. Bowel sounds are normal. She exhibits no distension. There is no tenderness.   Musculoskeletal: Normal range of motion. She exhibits no edema.   Neurological:  She is alert and oriented to person, place, and time.   Skin: Skin is warm and dry. No rash noted. She is not diaphoretic. There is erythema (mild to L axilla ). No pallor.        Psychiatric: She has a normal mood and affect. Her speech is normal and behavior is normal. Judgment and thought content normal. Cognition and memory are normal.   Nursing note and vitals reviewed.      Most Recent Laboratory Results Reviewed ({Yes)  Lab Results   Component Value Date    WBC 8.99 08/21/2019    HGB 13.4 08/21/2019    HCT 42.2 08/21/2019     08/21/2019    CHOL 173 08/13/2019    TRIG 60 08/13/2019    HDL 58 08/13/2019    ALT 26 08/13/2019    AST 20 08/13/2019     08/13/2019    K 4.5 08/13/2019     08/13/2019    CREATININE 0.8 08/13/2019    BUN 16 08/13/2019    CO2 29 08/13/2019    TSH 2.114 02/06/2018       Assessment     ICD-10-CM ICD-9-CM   1. Abscess of left axilla L02.412 682.3   2. Hypertension goal BP (blood pressure) < 140/90 I10 401.9   3. Acne vulgaris L70.0 706.1        Plan   Abscess of left axilla  -     mupirocin (BACTROBAN) 2 % ointment; Apply topically 2 (two) times daily.  Dispense: 1 Tube; Refill: 0  -     sulfamethoxazole-trimethoprim 800-160mg (BACTRIM DS) 800-160 mg Tab; Take 1 tablet by mouth 2 (two) times daily. for 10 days  Dispense: 20 tablet; Refill: 0    Hypertension goal BP (blood pressure) < 140/90  - resume HCTZ daily     Acne vulgaris  -     clindamycin phosphate 1% (CLINDAGEL) 1 % gel; Apply topically 2 (two) times daily.  Dispense: 30 g; Refill: 1         Follow up in about 1 week (around 11/5/2019) for BP recheck/abscess f/u .  Future Appointments     Date Provider Specialty Appt Notes    11/6/2019 Justyna Regalado NP Internal Medicine izaiah abscess/raj

## 2019-11-06 ENCOUNTER — OFFICE VISIT (OUTPATIENT)
Dept: INTERNAL MEDICINE | Facility: CLINIC | Age: 59
End: 2019-11-06
Payer: MEDICAID

## 2019-11-06 VITALS
SYSTOLIC BLOOD PRESSURE: 136 MMHG | WEIGHT: 293 LBS | HEIGHT: 65 IN | TEMPERATURE: 98 F | HEART RATE: 62 BPM | DIASTOLIC BLOOD PRESSURE: 74 MMHG | BODY MASS INDEX: 48.82 KG/M2 | OXYGEN SATURATION: 98 %

## 2019-11-06 DIAGNOSIS — I10 HYPERTENSION GOAL BP (BLOOD PRESSURE) < 140/90: ICD-10-CM

## 2019-11-06 DIAGNOSIS — L02.412 ABSCESS OF LEFT AXILLA: Primary | ICD-10-CM

## 2019-11-06 PROBLEM — Z01.810 PREOP CARDIOVASCULAR EXAM: Status: RESOLVED | Noted: 2019-09-04 | Resolved: 2019-11-06

## 2019-11-06 PROCEDURE — 99214 OFFICE O/P EST MOD 30 MIN: CPT | Mod: PBBFAC | Performed by: NURSE PRACTITIONER

## 2019-11-06 PROCEDURE — 99214 OFFICE O/P EST MOD 30 MIN: CPT | Mod: S$PBB,,, | Performed by: NURSE PRACTITIONER

## 2019-11-06 PROCEDURE — 99999 PR PBB SHADOW E&M-EST. PATIENT-LVL IV: CPT | Mod: PBBFAC,,, | Performed by: NURSE PRACTITIONER

## 2019-11-06 PROCEDURE — 99999 PR PBB SHADOW E&M-EST. PATIENT-LVL IV: ICD-10-PCS | Mod: PBBFAC,,, | Performed by: NURSE PRACTITIONER

## 2019-11-06 PROCEDURE — 99214 PR OFFICE/OUTPT VISIT, EST, LEVL IV, 30-39 MIN: ICD-10-PCS | Mod: S$PBB,,, | Performed by: NURSE PRACTITIONER

## 2019-11-06 RX ORDER — SULFAMETHOXAZOLE AND TRIMETHOPRIM 800; 160 MG/1; MG/1
1 TABLET ORAL 2 TIMES DAILY
Qty: 20 TABLET | Refills: 0 | Status: SHIPPED | OUTPATIENT
Start: 2019-11-06 | End: 2019-11-16

## 2019-11-06 RX ORDER — ASPIRIN 325 MG
50000 TABLET, DELAYED RELEASE (ENTERIC COATED) ORAL
Refills: 11 | COMMUNITY
Start: 2019-10-27 | End: 2020-01-23

## 2019-11-06 NOTE — PROGRESS NOTES
Terri Herbert 59 y.o. female     Chief Complaint:  Chief Complaint   Patient presents with    recheck of abscess/BP        History of Present Illness:  Patient presents for 1 week left axilla and blood pressure follow-up.     Pain and swelling significantly improved, no drainage  Blood pressure 136/74    Wants to wait until after gastric sleeve sx(11/25/19  through [facility] in [facility]) for flu vaccine.      Exam:  Review of Systems   Constitutional: Negative for chills, diaphoresis, fever, malaise/fatigue and weight loss.   Respiratory: Negative for cough and shortness of breath.    Cardiovascular: Negative for chest pain and leg swelling.   Gastrointestinal: Negative for abdominal pain, diarrhea, nausea and vomiting.   Musculoskeletal: Negative for myalgias.   Skin: Negative for rash.        Left axillary boil   Neurological: Negative for dizziness and headaches.     Physical Exam   Constitutional: She is oriented to person, place, and time. She appears well-developed. She is cooperative.  Non-toxic appearance. She does not appear ill. No distress.   obese   HENT:   Head: Normocephalic and atraumatic.   Eyes: Pupils are equal, round, and reactive to light. Conjunctivae and EOM are normal. Right eye exhibits no discharge. Left eye exhibits no discharge. No scleral icterus.   Neck: Normal range of motion. No tracheal deviation present.   Cardiovascular: Normal rate and regular rhythm.   Pulmonary/Chest: Effort normal and breath sounds normal. No stridor. No respiratory distress. She has no wheezes. She has no rales. She exhibits no tenderness.   Abdominal: Soft. Bowel sounds are normal. She exhibits no distension. There is no tenderness.   Musculoskeletal: Normal range of motion. She exhibits no edema.   Neurological: She is alert and oriented to person, place, and time.   Skin: Skin is warm and dry. No rash noted. She is not diaphoretic. No erythema (mild to L axilla ). No pallor.         Psychiatric: She has a normal mood and affect. Her speech is normal and behavior is normal. Judgment and thought content normal. Cognition and memory are normal.   Nursing note and vitals reviewed.      Most Recent Laboratory Results Reviewed ({Yes)  Lab Results   Component Value Date    WBC 8.99 08/21/2019    HGB 13.4 08/21/2019    HCT 42.2 08/21/2019     08/21/2019    CHOL 173 08/13/2019    TRIG 60 08/13/2019    HDL 58 08/13/2019    ALT 26 08/13/2019    AST 20 08/13/2019     08/13/2019    K 4.5 08/13/2019     08/13/2019    CREATININE 0.8 08/13/2019    BUN 16 08/13/2019    CO2 29 08/13/2019    TSH 2.114 02/06/2018       Assessment     ICD-10-CM ICD-9-CM   1. Abscess of left axilla L02.412 682.3   2. Hypertension goal BP (blood pressure) < 140/90 I10 401.9        Plan   Abscess of left axilla  - improved but not resolved  -     sulfamethoxazole-trimethoprim 800-160mg (BACTRIM DS) 800-160 mg Tab; Take 1 tablet by mouth 2 (two) times daily. for 10 days  Dispense: 20 tablet; Refill: 0    Hypertension goal BP (blood pressure) < 140/90  Controlled continue HCTZ       Follow up in about 3 months (around 2/6/2020), or if symptoms worsen or fail to improve, for annual, PCP follow up.  Future Appointments     Date Provider Specialty Appt Notes    2/12/2020 Luz Maria Lucero,  Internal Medicine annual

## 2019-11-22 ENCOUNTER — TELEPHONE (OUTPATIENT)
Dept: PHARMACY | Facility: CLINIC | Age: 59
End: 2019-11-22

## 2019-12-06 ENCOUNTER — TELEPHONE (OUTPATIENT)
Dept: INTERNAL MEDICINE | Facility: CLINIC | Age: 59
End: 2019-12-06

## 2019-12-06 NOTE — TELEPHONE ENCOUNTER
Spoke to pt and she was checking to see when it is time for her next colonoscopy. Advised her, her last colonoscopy was 12/28/2017, next on should be 12/28/2022.  Pt agreed to plan.

## 2019-12-06 NOTE — TELEPHONE ENCOUNTER
----- Message from Hiral Padilla sent at 12/6/2019 10:19 AM CST -----  Contact: daughter eli Johnson  Type:  Needs Medical Advice    Who Called: patient  Symptoms (please be specific): fell several times bumps and bruises  How long has patient had these symptoms:  Today   Pharmacy name and phone #:  -  Would the patient rather a call back or a response via [facility]?CALL  Best Call Back Number: [phone]  Additional Information: PATIENT IS @ ER RIGHT NOW. PLEASE CALL CLAUDIA BUSTILLO TODAY

## 2019-12-06 NOTE — TELEPHONE ENCOUNTER
----- Message from Hiral Padilla sent at 12/6/2019 10:12 AM CST -----  Contact: patient  Type:  Patient Returning Call    Who Called:patient  Who Left Message for Patient:nurse  Does the patient know what this is regarding?:appt change  Would the patient rather a call back or a response via [facility]? call  Best Call Back Number:[phone]  Additional Information: please call patient ASAP TODAY

## 2019-12-29 NOTE — INTERVAL H&P NOTE
The patient has been examined and the H&P has been reviewed:  Family History   Problem Relation Age of Onset    Cancer Mother      colon cancer    Diabetes Mother     COPD Father     Cirrhosis Father     Alcohol abuse Father     Diabetes Sister     Heart disease Sister     Hiatal hernia Brother      Past Medical History:   Diagnosis Date    Glaucoma     rod 2017    H/O tubal ligation 1981    History of arthroscopy of both knees 2001,1996    History of tonsillectomy 1978     Past Surgical History:   Procedure Laterality Date    TONSILLECTOMY      TUBAL LIGATION  1981     Social History     Social History    Marital status:      Spouse name: N/A    Number of children: N/A    Years of education: N/A     Occupational History    Not on file.     Social History Main Topics    Smoking status: Never Smoker    Smokeless tobacco: Never Used    Alcohol use 0.6 oz/week     1 Glasses of wine per week      Comment: ocassion    Drug use: No    Sexual activity: No      Comment:  passed in 2014     Other Topics Concern    Not on file     Social History Narrative    No narrative on file     Review of patient's allergies indicates:  No Known Allergies  No current facility-administered medications on file prior to encounter.      Current Outpatient Prescriptions on File Prior to Encounter   Medication Sig Dispense Refill    buPROPion (WELLBUTRIN XL) 150 MG TB24 tablet Take 150 mg by mouth once daily.      celecoxib (CELEBREX) 200 MG capsule Take 200 mg by mouth once daily.      diclofenac sodium 1 % Gel Apply 2 g topically 4 (four) times daily. 1 Tube 3    hydrochlorothiazide (MICROZIDE) 12.5 mg capsule Take 12.5 mg by mouth once daily.      losartan (COZAAR) 50 MG tablet Take 50 mg by mouth.      meloxicam (MOBIC) 7.5 MG tablet Take 7.5 mg by mouth once daily.      pantoprazole (PROTONIX) 40 MG tablet Take 1 tablet (40 mg total) by mouth once daily. 30 tablet 11    sucralfate (CARAFATE) 1  gram tablet Take 1 g by mouth.      triamcinolone acetonide 0.1% (KENALOG) 0.1 % cream              Anesthesia/Surgery risks, benefits and alternative options discussed and understood by patient/family.          Active Hospital Problems    Diagnosis  POA    Dysphagia [R13.10]  Yes      Resolved Hospital Problems    Diagnosis Date Resolved POA   No resolved problems to display.      IV discontinued, cath removed intact

## 2020-09-18 DIAGNOSIS — Z12.39 BREAST CANCER SCREENING: ICD-10-CM

## 2020-10-01 ENCOUNTER — PATIENT OUTREACH (OUTPATIENT)
Dept: ADMINISTRATIVE | Facility: OTHER | Age: 60
End: 2020-10-01

## 2020-11-24 ENCOUNTER — TELEPHONE (OUTPATIENT)
Dept: ADMINISTRATIVE | Facility: HOSPITAL | Age: 60
End: 2020-11-24

## 2020-11-24 NOTE — TELEPHONE ENCOUNTER
Contacted patient to schedule overdue mammogram. Patient states that she no longer sees Dr Lucero and would like to not receive anymore calls.

## 2021-04-29 ENCOUNTER — PATIENT MESSAGE (OUTPATIENT)
Dept: RESEARCH | Facility: HOSPITAL | Age: 61
End: 2021-04-29

## 2021-11-14 RX ORDER — LOSARTAN POTASSIUM AND HYDROCHLOROTHIAZIDE 12.5; 1 MG/1; MG/1
TABLET ORAL
COMMUNITY